# Patient Record
Sex: FEMALE | Race: OTHER | Employment: UNEMPLOYED | URBAN - METROPOLITAN AREA
[De-identification: names, ages, dates, MRNs, and addresses within clinical notes are randomized per-mention and may not be internally consistent; named-entity substitution may affect disease eponyms.]

---

## 2020-10-19 ENCOUNTER — HOSPITAL ENCOUNTER (OUTPATIENT)
Dept: PHYSICAL THERAPY | Age: 25
Discharge: HOME | End: 2020-10-19
Payer: COMMERCIAL

## 2020-10-19 PROCEDURE — 97162 PT EVAL MOD COMPLEX 30 MIN: CPT | Performed by: GENERAL ACUTE CARE HOSPITAL

## 2020-10-19 PROCEDURE — 97014 ELECTRIC STIMULATION THERAPY: CPT | Performed by: GENERAL ACUTE CARE HOSPITAL

## 2020-10-19 PROCEDURE — 97110 THERAPEUTIC EXERCISES: CPT | Performed by: GENERAL ACUTE CARE HOSPITAL

## 2020-10-19 NOTE — PROGRESS NOTES
[address] [facility] AT [address], [address] [place] [address] Road  Phone: [phone]   [phone]  PLAN OF CARE / [address] [place] [address] PHYSICAL THERAPY SERVICES  Patient Name: Ahsan Draper : 1995   Medical   Diagnosis: Right knee pain [M25.561] Treatment Diagnosis: Right knee pain [M25.561]   Onset Date: 10/8/20     Referral Source: Candance Boston, MD Start of [facility]): 10/19/2020   Prior Hospitalization: See medical history Provider #: [id number]   Prior Level of Function: Independent with all ADL's   Comorbidities: Depression, asthma, recent weight loss   Medications: Verified on Patient Summary List   The Plan of Care and following information is based on the information from the initial evaluation.   ===========================================================================================  Assessment / key information:  Pt is a 26 y/o female who presents s/p R ACL reconstruction with patellar tendon graft on 10/8/20. Pt states injury occurred in 2020 after jumping off a half flight of stairs and fell onto R knee. Went to [facility] whom stated patient only had a knee sprain. When pain and function did not improve, pt went to see an orthopedist. Pt is currently 1 wk, 4 days post op and has FU on . Pt has been using a CPM daily for mobility, initially performing 8 hours/day but recently only 4-6 hours/day- states she has worked up to 100 degrees flexion. Pt is icing once daily and reports that pain has been manageable. Pt weaned self from crutches and wearing knee immobilizer locked at 0 degrees.      FOTO 49/100  Pain (C) 4/10 \"dull ache\" (B) 3/10  (W) 8/10   Aggravating: activity  Easing: tylenol, ice  Palpation: +ttp R knee grossly   Circumference @ mid patella: R 45.5 cm, L 40.5 cm  Sensation: reports numbness of lateral knee and upper shin     Knee AROM: 8-75  PROM: 3-90  Patella mobility: guarded    Strength: Hip flex 3- based on SLR  Quad Set - poor, trace contraction  SLR- requiring 90% assistance    Gait : ambulating without crutches, brace locked in ext, antalgic gait with reduced R sided WB'ing, poor heel strike   Stairs: coached on correct pattern for step to pattern ascending with L LE and descending with R LE     ===========================================================================================  Eval Complexity: History HIGH Complexity :3+ comorbidities / personal factors will impact the outcome/ POC ;  Examination  HIGH Complexity : 4+ Standardized tests and measures addressing body structure, function, activity limitation and / or participation in recreation ; Presentation MEDIUM Complexity : Evolving with changing characteristics ; Decision Making MEDIUM Complexity : FOTO score of 26-74; Overall Complexity MEDIUM  Problem List: pain affecting function, decrease ROM, decrease strength, edema affecting function, impaired gait/ balance, decrease ADL/ functional abilitiies, decrease activity tolerance, decrease flexibility/ joint mobility and decrease transfer abilities   Treatment Plan may include any combination of the following: Therapeutic exercise, Therapeutic activities, Neuromuscular re-education, Physical agent/modality, Gait/balance training, Manual therapy, Aquatic therapy, Patient education, Self Care training, Functional mobility training, Home safety training and Stair training  Patient / Family readiness to learn indicated by: asking questions, trying to perform skills and interest  Persons(s) to be included in education: patient (P)  Barriers to Learning/Limitations: None  Measures taken, if barriers to learning:    Patient Goal (s): Better mobility, able to work out without limitation   Patient self reported health status: good  Rehabilitation Potential: good   Short Term Goals:  To be accomplished in  1  weeks:  1) Pt will be independent and compliant with HEP for carryover of strength and mobility gains   Long Term Goals: To be accomplished in  4-6  weeks:  1) Pt will score >72/100 on FOTO to show significant improvement in functional mobility and QOL  2) Pt will demo SLR without quad lag to show improved stance phase stability and readiness for (I) ambulation   3) Pt will demo full knee extension to 0 degrees for improved gait mechanics  4) Pt will demo knee flexion AROM >120 for ease of dressing ADL's and stair navigation  5) Pt will demo knee/hip strength >4+/5 for return to PLOF with normalized gait and squat kinematics   Frequency / Duration:   Patient to be seen  2-3  times per week for 4-6  weeks:  Patient / Caregiver education and instruction: activity modification and exercises  Therapist Signature: Hoa Perez PT Date: 60/04/6690   Certification Period: na Time: 7:12 AM   ===========================================================================================  I certify that the above Physical Therapy Services are being furnished while the patient is under my care. I agree with the treatment plan and certify that this therapy is necessary. Physician Signature:        Date:       Time:     Please sign and return to [facility] or you may fax the signed copy to [phone]. Thank you.

## 2020-10-19 NOTE — PROGRESS NOTES
PT DAILY TREATMENT NOTE     Patient Name: Contreras Guardado  Date:10/19/2020  : 1995  [x]  Patient  Verified  Payor: Praneeth Christianson / Plan: Dipika Escudero RPN / Product Type: Commerical /    In time:131  Out time:220  Total Treatment Time (min): 49  Total Timed Codes (min): 20  1:1 Treatment Time (min):39  Visit #: 1 of     Treatment Area: Right knee pain [M25.561]    SUBJECTIVE  Pain Level (0-10 scale): 4/10  Any medication changes, allergies to medications, adverse drug reactions, diagnosis change, or new procedure performed?: [x] No    [] Yes (see summary sheet for update)  Subjective functional status/changes:   [] No changes reported  Pt is a 28 y/o female who presents s/p R ACL reconstruction with patellar tendon graft on 10/8/20. Pt states injury occurred in 2020 after jumping off a half flight of stairs and fell onto R knee. Went to [facility] whom stated patient only had a knee sprain. When pain and function did not improve, pt went to see an orthopedist. Pt is currently 1 wk, 4 days post op and has FU on . Pt has been using a CPM daily for mobility, initially performing 8 hours/day but recently only 4-6 hours/day- states she has worked up to 100 degrees flexion. Pt is icing once daily and reports that pain has been manageable.  Pt weaned self from crutches and wearing knee immobilizer locked at 0 degrees.      FOTO 49/100  Pain (C) 4/10 \"dull ache\" (B) 3/10  (W) 8/10   Aggravating: activity  Easing: tylenol, ice  Palpation: +ttp R knee grossly   Circumference @ mid patella: R 45.5 cm, L 40.5 cm  Sensation: reports numbness of lateral knee and upper shin      Knee AROM: 8-75  PROM: 3-90  Patella mobility: guarded     Strength:   Hip flex 3- based on SLR  Quad Set - poor, trace contraction  SLR- requiring 90% assistance     Gait : ambulating without crutches, brace locked in ext, antalgic gait with reduced R sided WB'ing, poor heel strike   Stairs: coached on correct pattern for step to pattern ascending with L LE and descending with R LE     OBJECTIVE  Modality rationale: decrease edema, decrease inflammation, decrease pain and increase muscle contraction/control to improve the patients ability to improve stance phase stability and ambulation. Min Type Additional Details   10 [x] Estim: []Att   []Unatt  []TENS instruct                 []IFC  []Premod [x]NMES                       []Other: 10:10 on/off, with Quad set, co-contract 4 channels []w/US   [x]w/ice   []w/heat  Position:supine  Location: R quad , ice over knee    []  Traction: [] Cervical       []Lumbar                       [] Prone          []Supine                       []Intermittent   []Continuous Lbs:  [] before manual  [] after manual    []  Ultrasound: []Continuous   [] Pulsed                           []1MHz   []3MHz Location:  W/cm2:    []  Iontophoresis with dexamethasone         Location: [] Take home patch   [] In clinic    []  Ice     []  heat  []  Ice massage Position:  Location:    []  Vasopneumatic Device Pressure: [] lo [] med [] hi   Temp: [] lo [] med [] hi   [x] Skin assessment post-treatment:  [x]intact []redness- no adverse reaction       []redness  adverse reaction:       10 min Therapeutic Exercise:  [] See flow sheet :   Rationale: increase ROM and increase strength to improve the patients ability to ambulate and navigate stairs           min Patient Education: [x] Review HEP    [] Progressed/Changed HEP based on:   Provided patient with initial HEP- given printout and reviewed all exercises in clinic. Emphasized importance of knee extension AROM as primary goal along with pain/edema control.       Other Objective/Functional Measures:   Justification for Eval Code Complexity:  Patient History : depression, asthma, recent weight loss  Examination see exam   Clinical Presentation: evolving  Clinical Decision Making : FOTO : 49 /100    Pain Level (0-10 scale) post treatment: 3/10 \"sore\"    ASSESSMENT/Changes in Function: See POC    Patient will continue to benefit from skilled PT services to modify and progress therapeutic interventions, address functional mobility deficits, address ROM deficits, address strength deficits, analyze and address soft tissue restrictions, analyze and cue movement patterns, analyze and modify body mechanics/ergonomics, assess and modify postural abnormalities, address imbalance/dizziness and instruct in home and community integration to attain remaining goals.      [x]  See Plan of Care  []  See progress note/recertification  []  See Discharge Summary         Progress towards goals / Updated goals:  See POC    PLAN  [x]  Upgrade activities as tolerated     []  Continue plan of care  []  Update interventions per flow sheet       []  Discharge due to:_  []  Other:_      Hever Larios, PT 10/19/2020  10:07 AM

## 2020-10-23 ENCOUNTER — HOSPITAL ENCOUNTER (OUTPATIENT)
Dept: PHYSICAL THERAPY | Age: 25
Discharge: HOME | End: 2020-10-23
Payer: COMMERCIAL

## 2020-10-23 PROCEDURE — 97140 MANUAL THERAPY 1/> REGIONS: CPT

## 2020-10-23 PROCEDURE — 97110 THERAPEUTIC EXERCISES: CPT

## 2020-10-23 PROCEDURE — 97014 ELECTRIC STIMULATION THERAPY: CPT

## 2020-10-23 NOTE — PROGRESS NOTES
PT DAILY TREATMENT NOTE     Patient Name: Klaudia Welch  Date:10/23/2020  : 1995  [x]  Patient  Verified  Payor: Melissa Da Silva / Plan: Karla BLACKWOOD / Product Type: Commerical /    In time:1:18  Out time:2:23  Total Treatment Time (min): 65  Total Timed Codes (min): 65  1:1 Treatment Time (min):    Visit #: 2 of     Treatment Area: Right knee pain [M25.561]    SUBJECTIVE  Pain Level (0-10 scale): 0  Any medication changes, allergies to medications, adverse drug reactions, diagnosis change, or new procedure performed?: [x] No    [] Yes (see summary sheet for update)  Subjective functional status/changes:   [] No changes reported  My knee feels a lot better than it did last week, but I am still have a hard time sleeping as well as at the end of the day after getting off my feet from doing a lot of activity.     OBJECTIVE  Modality rationale: decrease inflammation, decrease pain and increase muscle contraction/control to improve the patients ability to improve quadriceps muscle recruitment/contraction   Min Type Additional Details   10 [] Estim: []Att   []Unatt  []TENS instruct                 []IFC  []Premod []NMES                       [x]Other:Indonesian 10 on/20 off  []w/US   [x]w/ice   []w/heat  Position:supine  Location:R quadriceps     []  Traction: [] Cervical       []Lumbar                       [] Prone          []Supine                       []Intermittent   []Continuous Lbs:  [] before manual  [] after manual    []  Ultrasound: []Continuous   [] Pulsed                           []1MHz   []3MHz Location:  W/cm2:    []  Iontophoresis with dexamethasone         Location: [] Take home patch   [] In clinic    []  Ice     []  heat  []  Ice massage Position:  Location:    []  Vasopneumatic Device Pressure: [] lo [] med [] hi   Temp: [] lo [] med [] hi   [] Skin assessment post-treatment:  []intact []redness- no adverse reaction       []redness  adverse reaction:       45 min Therapeutic Exercise: [x] See flow sheet :   Rationale: increase ROM, increase strength and increase proprioception to improve the patients ability to improve functional abilities     10 min Manual Therapy:  Patella mobs, manual hamstring stretching and flexion stretching/PROM on EOB   Rationale: decrease pain, increase ROM and increase tissue extensibility to improve functional mobility            min Patient Education: [x] Review HEP    [] Progressed/Changed HEP based on:   [] positioning   [] body mechanics   [] transfers   [] heat/ice application        Other Objective/Functional Measures:     Pain Level (0-10 scale) post treatment: 0    ASSESSMENT/Changes in Function: Pt tolerated all new therex fairly well upon trial today with chief c/o anterior knee pain/tension with pushing end range flexion mobility with self and passive stretching. Pt also needed min assist with SLR's as well as difficulty with LE management with bed mobility. Pt needs the most focus on quad strengthening and knee flexion mobility. Will continue to progress/advance patient within current POC as tolerated with monitoring symptoms. Patient will continue to benefit from skilled PT services to modify and progress therapeutic interventions, address functional mobility deficits, address ROM deficits, address strength deficits, analyze and address soft tissue restrictions, analyze and cue movement patterns and instruct in home and community integration to attain remaining goals. []  See Plan of Care  []  See progress note/recertification  []  See Discharge Summary         Progress towards goals / Updated goals: · Short Term Goals: To be accomplished in  1  weeks:  1) Pt will be independent and compliant with HEP for carryover of strength and mobility gains 10/23/20: Met  · Long Term Goals:  To be accomplished in  4-6  weeks:  1) Pt will score >72/100 on FOTO to show significant improvement in functional mobility and QOL  2) Pt will demo SLR without quad lag to show improved stance phase stability and readiness for (I) ambulation   3) Pt will demo full knee extension to 0 degrees for improved gait mechanics  4) Pt will demo knee flexion AROM >120 for ease of dressing ADL's and stair navigation  5) Pt will demo knee/hip strength >4+/5 for return to PLOF with normalized gait and squat kinematics     PLAN  [x]  Upgrade activities as tolerated     []  Continue plan of care  []  Update interventions per flow sheet       []  Discharge due to:_  []  Other:_      Marcelle Simpson PTA 10/23/2020  1:21 PM      Future Appointments   Date Time Provider Leidy Rvias   10/26/2020  9:30 AM Deana Mcqueen, PT [facility]   10/28/2020  2:45 PM Azar Young, PT [facility]   10/30/2020  9:45 AM John MOISE, PT [facility]   11/2/2020 11:15 AM Antonino Huggins, PTA [facility]   11/4/2020 11:15 AM Nuzhat Lemons, PT [facility]   11/6/2020 10:30 AM John MOISE, PT [facility]   11/9/2020  1:15 PM Azar Young, PT [facility]   11/11/2020 12:00 PM Ellyn BROWNLEE, PT [facility]   11/16/2020  1:15 PM Azar Hannah, PT [facility]   11/18/2020  1:30 PM Antonino Huggins, PROSPER [facility]   11/23/2020  1:15 PM Azar Young, PT [facility]   11/25/2020  2:15 PM Antonino Huggins PTA [facility]   11/30/2020  1:45 PM Antonino Huggins PTA [facility]

## 2020-10-26 ENCOUNTER — HOSPITAL ENCOUNTER (OUTPATIENT)
Dept: PHYSICAL THERAPY | Age: 25
Discharge: HOME | End: 2020-10-26
Payer: COMMERCIAL

## 2020-10-26 PROCEDURE — 97110 THERAPEUTIC EXERCISES: CPT

## 2020-10-26 PROCEDURE — 97014 ELECTRIC STIMULATION THERAPY: CPT

## 2020-10-26 PROCEDURE — 97140 MANUAL THERAPY 1/> REGIONS: CPT

## 2020-10-26 NOTE — PROGRESS NOTES
PT DAILY TREATMENT NOTE     Patient Name: Kayode Hayden  Date:10/26/2020  : 1995  [x]  Patient  Verified  Payor: Debbie Joyner / Plan: Andra Breaux RPN / Product Type: Commerical /    In time:  9:31 AM  Out time: 10:36  Total Treatment Time (min): 65  Total Timed Codes (min): 65  1:1 Treatment Time (min):    Visit #: 3 of     Treatment Area: Right knee pain [M25.561]    SUBJECTIVE  Pain Level (0-10 scale): 0/10  Any medication changes, allergies to medications, adverse drug reactions, diagnosis change, or new procedure performed?: [x] No    [] Yes (see summary sheet for update)  Subjective functional status/changes:   [] No changes reported  Pt c/o tightness to knee. States it is easier to sleep now. She feels her leg is doing better now than she was expecting. Has some difficulty with heel slide HEP since she d/c'd use of CPM last Thurs.        OBJECTIVE  Modality rationale: decrease inflammation, decrease pain and increase muscle contraction/control to improve the patients ability to improve quadriceps muscle recruitment/contraction   Min Type Additional Details   10 [x] Estim: []Att   []Unatt  []TENS instruct                 []IFC  []Premod []NMES                       [x]Other:Botswanan 10 on/30 off  []w/US   [x]w/ice   []w/heat  Position:supine  Location:R quadriceps     []  Traction: [] Cervical       []Lumbar                       [] Prone          []Supine                       []Intermittent   []Continuous Lbs:  [] before manual  [] after manual    []  Ultrasound: []Continuous   [] Pulsed                           []1MHz   []3MHz Location:  W/cm2:    []  Iontophoresis with dexamethasone         Location: [] Take home patch   [] In clinic    []  Ice     []  heat  []  Ice massage Position:  Location:    []  Vasopneumatic Device Pressure: [] lo [] med [] hi   Temp: [] lo [] med [] hi   [] Skin assessment post-treatment:  []intact []redness- no adverse reaction       []redness  adverse reaction: 45 min Therapeutic Exercise:  [x] See flow sheet :   Rationale: increase ROM, increase strength and increase proprioception to improve the patients ability to improve functional abilities     10 min Manual Therapy:  Patella mobs, manual hamstring stretching and flexion stretching/PROM    Rationale: decrease pain, increase ROM and increase tissue extensibility to improve functional mobility            min Patient Education: [x] Review HEP    [] Progressed/Changed HEP based on:   [] positioning   [] body mechanics   [] transfers   [] heat/ice application        Other Objective/Functional Measures:   -added AA LAQ at EOB, s/l hip abd in brace, prone hip ext in brace  -reviewed safe technique for self stretching with HEP (heel slides supine/seated)    Pain Level (0-10 scale) post treatment:  2/10    ASSESSMENT/Changes in Function: Mild edema to superior patella; bruising to proximal calf/lower leg. Pt with limited patellar glides sup/inf; addressed with manual.  Able to advance with exercises as per flow sheet; pt ed on DOMS. Continues to demo weak QS with exercises; not yet ready to progress out of brace in wb'ing. Patient will continue to benefit from skilled PT services to modify and progress therapeutic interventions, address functional mobility deficits, address ROM deficits, address strength deficits, analyze and address soft tissue restrictions, analyze and cue movement patterns and instruct in home and community integration to attain remaining goals. []  See Plan of Care  []  See progress note/recertification  []  See Discharge Summary         Progress towards goals / Updated goals: · Short Term Goals: To be accomplished in  1  weeks:  1) Pt will be independent and compliant with HEP for carryover of strength and mobility gains 10/23/20: Met    · Long Term Goals:  To be accomplished in  4-6  weeks:  1) Pt will score >72/100 on FOTO to show significant improvement in functional mobility and QOL  2) Pt will demo SLR without quad lag to show improved stance phase stability and readiness for (I) ambulation.-10/26: goal not met; QS fair with need for min A with SLR flexion  3) Pt will demo full knee extension to 0 degrees for improved gait mechanics  4) Pt will demo knee flexion AROM >120 for ease of dressing ADL's and stair navigation  5) Pt will demo knee/hip strength >4+/5 for return to [facility] with normalized gait and squat kinematics     PLAN  [x]  Upgrade activities as tolerated     []  Continue plan of care  []  Update interventions per flow sheet       []  Discharge due to:_  []  Other:_      Júnior Ordoñez, PT 10/26/2020  11:05 AM       Future Appointments   Date Time Provider Leidy Rivas   10/26/2020  9:30 AM Deana Mcqueen, PT [facility]   10/28/2020  2:45 PM Azar Young, PT [facility]   10/30/2020  9:45 AM John MOISE, PT [facility]   11/2/2020 11:15 AM Antonino Huggins, PROSPER [facility]   11/4/2020 11:15 AM Nuzhat Lemons, PT [facility]   11/6/2020 10:30 AM John MOISE, PT [facility]   11/9/2020  1:15 PM Azar Carp, PT [facility]   11/11/2020 12:00 PM Ellyn BROWNLEE, PT [facility]   11/16/2020  1:15 PM Azar Carp, PT [facility]   11/18/2020  1:30 PM Antonino Huggins, PROSPER [facility]   11/23/2020  1:15 PM Azar Carp, PT [facility]   11/25/2020  2:15 PM Antonino Huggins PTA [facility]   11/30/2020  1:45 PM Antonino Huggins PTA [facility]

## 2020-10-28 ENCOUNTER — HOSPITAL ENCOUNTER (OUTPATIENT)
Dept: PHYSICAL THERAPY | Age: 25
Discharge: HOME | End: 2020-10-28
Payer: COMMERCIAL

## 2020-10-28 PROCEDURE — 97110 THERAPEUTIC EXERCISES: CPT

## 2020-10-28 PROCEDURE — 97112 NEUROMUSCULAR REEDUCATION: CPT

## 2020-10-28 PROCEDURE — 97140 MANUAL THERAPY 1/> REGIONS: CPT

## 2020-10-28 PROCEDURE — 97014 ELECTRIC STIMULATION THERAPY: CPT

## 2020-10-28 NOTE — PROGRESS NOTES
PHYSICAL THERAPY - DAILY TREATMENT NOTE    Patient Name: David Springer        Date: 10/28/2020  : 1995   yes Patient  Verified  Visit #:   4     Insurance: Payor: Soraya Naik / Plan: [id number] RPN / Product Type: Commerical /      In time: 2:42 Out time: 3:42   Total Treatment Time: 60     Medicare/[facility] Time Tracking (below)   Total Timed Codes (min):  na 1:1 Treatment Time:  na     TREATMENT AREA =  Right knee pain [M25.561]    SUBJECTIVE  Pain Level (on 0 to 10 scale):  2  / 10   Medication Changes/New allergies or changes in medical history, any new surgeries or procedures?    no  If yes, update Summary List   Subjective Functional Status/Changes:  []  No changes reported     \"I think I overdid it trying to bend my knee yesterday.  I am pretty sore and it was swollen a little\"          OBJECTIVE  Modalities Rationale:     decrease inflammation, decrease pain and increase muscle contraction/control to improve patient's ability to complete ADLs  10 min [x] Estim, type/location: [place] to R VMO/RF, 65 pps, 5 sec ramp 10:30 w/ quad set in supine                                     []  att     [x]  unatt     []  w/US     [x]  w/ice    []  w/heat    min []  Mechanical Traction: type/lbs                   []  pro   []  sup   []  int   []  cont    []  before manual    []  after manual    min []  Ultrasound, settings/location:      min []  Iontophoresis w/ dexamethasone, location:                                               []  take home patch       []  in clinic    min []  Ice     []  Heat    location/position:     min []  Vasopneumatic Device, press/temp:     min []  Other:    [x] Skin assessment post-treatment (if applicable):    [x]  intact    [x]  redness- no adverse reaction     []redness  adverse reaction:        32 min Therapeutic Exercise:  [x]  See flow sheet   Rationale:      increase ROM, increase strength and improve coordination to improve the patients ability to ambulate     10 min Manual Therapy: Sup/inf patella mob, popliteal release, STM to bicep femoris, PROM knee flexion   Rationale:      decrease pain, increase ROM, increase tissue extensibility and decrease trigger points to improve patient's ability to flex R knee    8 min Neuromuscular Re-ed: [x]  See flow sheet   Rationale:    improve coordination, improve balance and increase proprioception to improve the patients ability to ambulate      Billed With/As:   [] TE   [] TA   [] Neuro   [] Self Care Patient Education: [x] Review HEP    [] Progressed/Changed HEP based on:   [] positioning   [] body mechanics   [] transfers   [] heat/ice application    [] other:      Other Objective/Functional Measures:    Heavy hip flexor compensation when performing SL hip abd; tactile cueing and assist to perform correctly  Able to perform 10x SLR flex w/o assist w/ brace donned and locked in extension  Added bike rocking for knee flexion AAROM, standing TKE and SLS to improve LE proprioception, and RDLs to improve posterior chain strength  Held OKC knee extension to prevent early strain to graft site, okay to resume around 4 weeks post op per recent studies  Pre manual AROM 1-90; post manual AROM 0-96     Post Treatment Pain Level (on 0 to 10) scale:   2  / 10     ASSESSMENT  Assessment/Changes in Function:     Pt demo improving quad/hip flexor strength as evident by ability to perform SLR flex w/o A today. Added multiple closed chain exercises to improve LE strength/stability and proprioception. Plan to incorporate additional proprioceptive exercises as tolerated at NV. Showed pt additional knee flexion AAROM exercises, including supine wall slides, as she continues to c/o pain and difficulty with heel slides.       []  See Progress Note/Recertification   Patient will continue to benefit from skilled PT services to modify and progress therapeutic interventions, address functional mobility deficits, address ROM deficits, address strength deficits, analyze and address soft tissue restrictions, analyze and cue movement patterns, analyze and modify body mechanics/ergonomics, assess and modify postural abnormalities and instruct in home and community integration to attain remaining goals. Progress toward goals / Updated goals: · Short Term Goals: To be accomplished in  1  weeks:  1) Pt will be independent and compliant with HEP for carryover of strength and mobility gains 10/23/20: Met     · Long Term Goals: To be accomplished in  4-6  weeks:  1) Pt will score >72/100 on FOTO to show significant improvement in functional mobility and QOL  2) Pt will demo SLR without quad lag to show improved stance phase stability and readiness for (I) ambulation.  10/28/2020: goal in progress, SLR in brace locked in ext w/o A  3) Pt will demo full knee extension to 0 degrees for improved gait mechanics 10/28/20: goal nearly met, knee ext AROM -1 deg pre manual, able to achieve TKE post manual  4) Pt will demo knee flexion AROM >120 for ease of dressing ADL's and stair navigation  5) Pt will demo knee/hip strength >4+/5 for return to PLOF with normalized gait and squat kinematics        PLAN  []  Upgrade activities as tolerated yes Continue plan of care   []  Discharge due to :    []  Other:      Therapist: Chantal Barba PT    Date: 10/28/2020 Time: 2:53 PM     Future Appointments   Date Time Provider Leidy Rivas   10/30/2020  9:45 AM Cr White, PT [facility]   11/2/2020 11:15 AM Salty Lay, PTA [facility]   11/4/2020 11:15 AM Declan Lemons, PT [facility]   11/6/2020 10:30 AM Gilbert MOISE, PT [facility]   11/9/2020  1:15 PM Ruthann Baker, PT [facility]   11/11/2020 12:00 PM Rob BROWNLEE, PT [facility]   11/16/2020  1:15 PM Ruthann Baker, PT [facility]   11/18/2020  1:30 PM Salty Lay, PTA [facility]   11/23/2020  1:15 PM Ruthann Baker, PT [facility]   11/25/2020  2:15 PM Salty Lay, PROSPER [facility] 11/30/2020  1:45 PM Elgin Ayon, PTA [facility] [address]

## 2020-10-30 ENCOUNTER — APPOINTMENT (OUTPATIENT)
Dept: PHYSICAL THERAPY | Age: 25
End: 2020-10-30
Payer: COMMERCIAL

## 2020-11-02 ENCOUNTER — HOSPITAL ENCOUNTER (OUTPATIENT)
Dept: PHYSICAL THERAPY | Age: 25
Discharge: HOME | End: 2020-11-02
Payer: COMMERCIAL

## 2020-11-02 PROCEDURE — 97140 MANUAL THERAPY 1/> REGIONS: CPT

## 2020-11-02 PROCEDURE — 97110 THERAPEUTIC EXERCISES: CPT

## 2020-11-02 NOTE — PROGRESS NOTES
[facility] PHYSICAL THERAPY  [address], [place], [address] - Phone: [phone]  Fax: [phone]  PROGRESS NOTE  Patient Name: Laurie Viera : 1995   Treatment/Medical Diagnosis: Right knee pain [M25.561]   Referral Source: Em Marte MD     Date of Initial Visit: 10/19/20 Attended Visits: 5 Missed Visits: 1     SUMMARY OF TREATMENT  Therapeutic exercise for R LE/knee focused mobility and strengthening within current guidelines of ACL protocol, manual intervention, electrical stimulation for quadriceps muscle re education, cryotherapy, patient education and HEP. CURRENT STATUS  Patient is currently approximately 3 1/2 weeks status post ACL re construction (10/8/20). Pt reports approximately 10-15% overall improvement from therapy since initial evaluation with 2/10 pain level on average, increased to 5-6/10 at the worst with prolonged standing ADL's as well as after performing therex regiment. Pt is making slow but steady progress with gaining R knee mobility and strengthening within appropriate guidelines of ACL protocol. Pt demonstrates approximately 15 degrees quadriceps lag with performing SLR's with ACL brace in locked position, but is able to perform them independently without assistance with moderate challenge. Pt would benefit from continued therapy for 24 additional visits to achieve maximum medical benefit/potential from current POC. Will continue to progress/advance patient within current POC as tolerated with monitoring symptoms.   Knee AROM measurements= (measured in supine) R=0-97 degrees; L=0-134 degrees; R knee flexion PROM= 103 degrees (measured on EOB)  LE strength measurements/MMT= (R/L) Quads R=2+/5 due to approximately 15 degree active quadriceps lag with performing SLR's with brace on without assistance with moderate challenge; L=5/5; Hamstrings= 4-/5, 5/5  HIP= (R/L) Flexion=4/5, 5/5; Abduction=4+/5, 5/5; Extension=4+/5 bilaterally   Goal/Measure of Progress Goal Met? 1. Pt will be independent and compliant with HEP for carryover of strength and mobility gains   Status at last Eval: Progressing  Current Status: Met yes   2. Pt will score >72/100 on FOTO to show significant improvement in functional mobility and QOL   Status at last Eval: 49/100 Current Status: 52/100 no   3. Pt will demo SLR without quad lag to show improved stance phase stability and readiness for (I) ambulation    Status at last Eval: SLR- requiring 90% assistance Current Status:  Pt demonstrates approximately 15 degrees quadriceps lag with performing SLR's with ACL brace in locked position, but is able to perform them independently without assistance with moderate challenge. no       Goal/Measure of Progress Goal Met? 4. Pt will demo full knee extension to 0 degrees for improved gait mechanics   Status at last Eval: R knee extension AROM= -8 degrees measured in supine Current Status: R knee extension AROM= 0 degrees measured in supine yes   5. Pt will demo knee flexion AROM >120 for ease of dressing ADL's and stair navigation   Status at last Eval: R knee flexion AROM= 75 degrees (measured in supine) Current Status: R knee flexion AROM= 97 degrees (measured in supine) no   6. Pt will demo knee/hip strength >4+/5 for return to PLOF with normalized gait and squat kinematics    Status at last Eval: Hip flex 3- based on SLR  Quad Set - poor, trace contraction  SLR- requiring 90% assistance Current Status: Quads R=2+/5 due to approximately 15 degree active quadriceps lag with performing SLR's with brace on without assistance with moderate challenge; Hamstrings= 4-/5  HIP= (R/L) Flexion=4/5, 5/5; Abduction=4+/5, 5/5; Extension=4+/5 bilaterally         no     New Goals to be achieved in __24__  treatments:  1. Pt will score >72/100 on FOTO to show significant improvement in functional mobility and QOL  2.   Pt will demo SLR without quad lag to show improved stance phase stability and readiness for (I) ambulation  3. Pt will demo knee flexion AROM >120 for ease of dressing ADL's and stair navigation  4. Pt will demo knee/hip strength >4+/5 for return to PLOF with normalized gait and squat kinematics   RECOMMENDATIONS  Continue with current POC for 24 additional visits with advancing as tolerated, then reassess for the need for continuation or discharge from therapy. If you have any questions/comments please contact us directly at [phone]. Thank you for allowing us to assist in the care of your patient. Therapist Signature: Carrie Griffith PTA Date: 11/2/2020    Deena Rehman PT, DPT, CMTPT Time: 12:35 PM   NOTE TO PHYSICIAN:  PLEASE COMPLETE THE ORDERS BELOW AND FAX TO   [facility]: [phone]  If you are unable to process this request in 24 hours please contact our office: [phone]    ___ I have read the above report and request that my patient continue as recommended.   ___ I have read the above report and request that my patient continue therapy with the following changes/special instructions:_________________________________________________________   ___ I have read the above report and request that my patient be discharged from therapy.      Physician Signature:        Date:       Time:

## 2020-11-02 NOTE — PROGRESS NOTES
PT DAILY TREATMENT NOTE     Patient Name: Sandra Willoughby  Date:2020  : 1995  [x]  Patient  Verified  Payor: Heidy Reardon / Plan: [employer]i [employer] RPN / Product Type: Commerical /    In time:11:15  Out time:12:15  Total Treatment Time (min): 60  Total Timed Codes (min): 50  1:1 Treatment Time (min):    Visit #: 5 of     Treatment Area: Right knee pain [M25.561]    SUBJECTIVE  Pain Level (0-10 scale): 1-2/10  Any medication changes, allergies to medications, adverse drug reactions, diagnosis change, or new procedure performed?: [x] No    [] Yes (see summary sheet for update)  Subjective functional status/changes:   [] No changes reported  My knee felt pretty good after I was here last time, I wasn't even that sore later on that night after doing all of the new exercises like I thought it would be.     OBJECTIVE  Modality rationale: decrease edema, decrease inflammation and decrease pain to improve the patients ability to improve functional abilities   Min Type Additional Details    [] Estim: []Att   []Unatt  []TENS instruct                 []IFC  []Premod []NMES                       []Other:  []w/US   []w/ice   []w/heat  Position:  Location:    []  Traction: [] Cervical       []Lumbar                       [] Prone          []Supine                       []Intermittent   []Continuous Lbs:  [] before manual  [] after manual    []  Ultrasound: []Continuous   [] Pulsed                           []1MHz   []3MHz Location:  W/cm2:    []  Iontophoresis with dexamethasone         Location: [] Take home patch   [] In clinic   10 [x]  Ice     []  heat  []  Ice massage Position:long sitting   Location:R knee    []  Vasopneumatic Device Pressure: [] lo [] med [] hi   Temp: [] lo [] med [] hi   [] Skin assessment post-treatment:  []intact []redness- no adverse reaction       []redness  adverse reaction:       35 min Therapeutic Exercise:  [x] See flow sheet :   Rationale: increase ROM, increase strength, improve balance and increase proprioception to improve the patients ability to improve functional abilities     15 min Manual Therapy:  Removed steri strips, patella mobs, knee flexion PROM on EOB and extensive bilateral LE strength/MMT measurements    Rationale: decrease pain, increase ROM, increase tissue extensibility and decrease trigger points to improve functional mobility            min Patient Education: [x] Review HEP    [] Progressed/Changed HEP based on:   [] positioning   [] body mechanics   [] transfers   [] heat/ice application        Other Objective/Functional Measures:     Pain Level (0-10 scale) post treatment: 1/10    ASSESSMENT/Changes in Function:       Patient will continue to benefit from skilled PT services to modify and progress therapeutic interventions, address functional mobility deficits, address ROM deficits, address strength deficits, analyze and address soft tissue restrictions, analyze and cue movement patterns and instruct in home and community integration to attain remaining goals. []  See Plan of Care  [x]  See progress note/recertification  []  See Discharge Summary         Progress towards goals / Updated goals:  See Progress note/MNR for full detailed progress towards established goals.     PLAN  []  Upgrade activities as tolerated     []  Continue plan of care  []  Update interventions per flow sheet       []  Discharge due to:_  []  Other:_      Raj Cho PTA 11/2/2020  11:21 AM      Future Appointments   Date Time Provider Leidy Daughertyi   11/4/2020 11:15 AM Eyal Lemons, PT [facility]   11/6/2020 10:30 AM Delayne Schilder D., PT [facility]   11/9/2020  1:15 PM Tete Chen, PT [facility]   11/11/2020 12:00 PM Tete Chen, PT [facility]   11/16/2020  1:15 PM Tenisha Torres, PT [facility]   11/18/2020  1:30 PM Aurora Ibrahim, PROSPER [facility]   11/23/2020  1:15 PM Tenisha Torres, PT [facility]   11/25/2020  2:15 PM Aurora Ibrahim PTA [facility] 11/30/2020  1:45 PM Genesis Burden, PTA [facility]

## 2020-11-04 ENCOUNTER — HOSPITAL ENCOUNTER (OUTPATIENT)
Dept: PHYSICAL THERAPY | Age: 25
Discharge: HOME | End: 2020-11-04
Payer: COMMERCIAL

## 2020-11-04 PROCEDURE — 97110 THERAPEUTIC EXERCISES: CPT

## 2020-11-04 PROCEDURE — 97014 ELECTRIC STIMULATION THERAPY: CPT

## 2020-11-04 PROCEDURE — 97140 MANUAL THERAPY 1/> REGIONS: CPT

## 2020-11-04 NOTE — PROGRESS NOTES
PHYSICAL THERAPY - DAILY TREATMENT NOTE    Patient Name: Kayley Saez        Date: 2020  : 1995   yes Patient  Verified  Visit #:   6     Insurance: Payor: Cam Mckinnon / Plan: Renato Mancia RPN / Product Type: Commerical /      In time: 11:16 Out time: 11:08   Total Treatment Time: 52     Medicare/[employer] Time Tracking (below)   Total Timed Codes (min):  na 1:1 Treatment Time:  na     TREATMENT AREA =  Right knee pain [M25.561]    SUBJECTIVE  Pain Level (on 0 to 10 scale):  0  / 10   Medication Changes/New allergies or changes in medical history, any new surgeries or procedures?    no  If yes, update Summary List   Subjective Functional Status/Changes:  []  No changes reported     Pt reports no pain this AM. Reports she continues to perform her exercises at home.            OBJECTIVE  Modalities Rationale:     decrease edema, decrease inflammation, decrease pain and increase muscle contraction/control to improve patient's ability to ambulate w/o brace  10 min [x] Estim, type/location: [place] stim to R quad, 65 pps, 5 sec ramp 10:50 w/ quad set                                     []  att     []  unatt     []  w/US     []  w/ice    []  w/heat    min []  Mechanical Traction: type/lbs                   []  pro   []  sup   []  int   []  cont    []  before manual    []  after manual    min []  Ultrasound, settings/location:      min []  Iontophoresis w/ dexamethasone, location:                                               []  take home patch       []  in clinic    min []  Ice     []  Heat    location/position:     min []  Vasopneumatic Device, press/temp:     min []  Other:    [] Skin assessment post-treatment (if applicable):    []  intact    []  redness- no adverse reaction     []redness  adverse reaction:        32 min Therapeutic Exercise:  [x]  See flow sheet   Rationale:      increase ROM, increase strength, improve coordination, improve balance and increase proprioception to improve the patients ability to ambulate, transfer     10 min Manual Therapy: Patella mobs, STM to mid-substance RF, calf and hs str, PROM knee flexion   Rationale:      decrease pain, increase ROM, increase tissue extensibility and decrease trigger points to improve patient's ability to ambulate, transfer  The manual therapy interventions were performed at a separate and distinct time from the therapeutic activities interventions. Billed With/As:   [x] TE   [] TA   [] Neuro   [] Self Care Patient Education: [x] Review HEP    [] Progressed/Changed HEP based on:   [] positioning   [] body mechanics   [] transfers   [] heat/ice application    [] other:      Other Objective/Functional Measures:    Able to perform SLR flex w/ brace unlocked, minimal extensor lag noted but limited to sets of 5 2' fatigue  Knee flexion PROM cont to be limited to ~100 deg     Post Treatment Pain Level (on 0 to 10) scale:   0  / 10     ASSESSMENT  Assessment/Changes in Function:     Cleared pt to perform SLR w/ brace unlocked at home, continue to ambulate w/ brace locked. If pt demo ability to perform 10x SLR flex w/o lag and demo ability to ambulate in clinic w/ brace unlocked, will clear pt to begin ambulation in home w/ brace unlocked      []  See Progress Note/Recertification   Patient will continue to benefit from skilled PT services to modify and progress therapeutic interventions, address functional mobility deficits, address ROM deficits, address strength deficits, analyze and address soft tissue restrictions, analyze and cue movement patterns, analyze and modify body mechanics/ergonomics, assess and modify postural abnormalities, address imbalance/dizziness and instruct in home and community integration to attain remaining goals. Progress toward goals / Updated goals:    1.  Pt will score >72/100 on FOTO to show significant improvement in functional mobility and QOL  2.  Pt will demo SLR without quad lag to show improved stance phase stability and readiness for (I) ambulation 11/4/20: mild extensor lag, SLR 2x5  3. Pt will demo knee flexion AROM >120 for ease of dressing ADL's and stair navigation  4. Pt will demo knee/hip strength >4+/5 for return to PLOF with normalized gait and squat kinematics        PLAN  []  Upgrade activities as tolerated yes Continue plan of care   []  Discharge due to :    []  Other:      Therapist: Adele Bullock PT    Date: 11/4/2020 Time: 11:13 AM     Future Appointments   Date Time Provider Leidy Rivas   11/4/2020 11:15 AM Kaye Lemons, PT [facility]   11/6/2020 10:30 AM Maida MOISE, PT [facility]   11/9/2020  1:15 PM Mercedes Desir, PT [facility]   11/11/2020 12:00 PM Mercedes Desir, PT [facility]   11/16/2020  1:15 PM Mercedes Desir, PT [facility]   11/18/2020  1:30 PM Joie Michaels, PTA [facility]   11/23/2020  1:15 PM Mercedes Desir, PT [facility]   11/25/2020  2:15 PM Joie Michaels, PTA [facility]   11/30/2020  1:45 PM Joie Michaels, PTA [facility]

## 2020-11-06 ENCOUNTER — HOSPITAL ENCOUNTER (OUTPATIENT)
Dept: PHYSICAL THERAPY | Age: 25
Discharge: HOME | End: 2020-11-06
Payer: COMMERCIAL

## 2020-11-06 PROCEDURE — 97110 THERAPEUTIC EXERCISES: CPT

## 2020-11-06 PROCEDURE — 97140 MANUAL THERAPY 1/> REGIONS: CPT

## 2020-11-06 PROCEDURE — 97014 ELECTRIC STIMULATION THERAPY: CPT

## 2020-11-06 NOTE — PROGRESS NOTES
PHYSICAL THERAPY - DAILY TREATMENT NOTE    Patient Name: Kerri Aleman        Date: 2020  : 1995   yes Patient  Verified  Visit #:     Insurance: Payor: Christopher Tran / Plan: Manuel Payne RPN / Product Type: Commerical /      In time: 10:31 AM Out time: 11:30   Total Treatment Time: 59     Medicare/[facility] Time Tracking (below)   Total Timed Codes (min):  na 1:1 Treatment Time:  na     TREATMENT AREA =  Right knee pain [M25.561]    SUBJECTIVE  Pain Level (on 0 to 10 scale):  0  / 10   Medication Changes/New allergies or changes in medical history, any new surgeries or procedures?    no  If yes, update Summary List   Subjective Functional Status/Changes:  []  No changes reported     Pt states her knee has been feeling a little stiff/swollen especially in the AM.       OBJECTIVE  Modalities Rationale:     decrease edema, decrease inflammation, decrease pain and increase muscle contraction/control to improve patient's ability to ambulate w/o brace  10 min [x] Estim, type/location: [place] stim to R quad, 65 pps, 5 sec ramp 10:30 w/ quad set                                     []  att     [x]  unatt     []  w/US     [x]  w/ice    []  w/heat    min []  Mechanical Traction: type/lbs                   []  pro   []  sup   []  int   []  cont    []  before manual    []  after manual    min []  Ultrasound, settings/location:      min []  Iontophoresis w/ dexamethasone, location:                                               []  take home patch       []  in clinic    min []  Ice     []  Heat    location/position:     min []  Vasopneumatic Device, press/temp:     min []  Other:    [] Skin assessment post-treatment (if applicable):    []  intact    []  redness- no adverse reaction     []redness  adverse reaction:        37 min Therapeutic Exercise:  [x]  See flow sheet   Rationale:      increase ROM, increase strength, improve coordination, improve balance and increase proprioception to improve the patients ability to ambulate, transfer       12 min Manual Therapy: Patella mobs, STM to mid-substance RF, calf and hs str, PROM knee flexion/ext   Rationale:      decrease pain, increase ROM, increase tissue extensibility and decrease trigger points to improve patient's ability to ambulate, transfer  The manual therapy interventions were performed at a separate and distinct time from the therapeutic activities interventions. Billed With/As:   [x] TE   [] TA   [] Neuro   [] Self Care Patient Education: [x] Review HEP    [] Progressed/Changed HEP based on:   [] positioning   [] body mechanics   [] transfers   [] heat/ice application    [] other:      Other Objective/Functional Measures:    -knee rocks to ~ 90 in brace on bike today  -c/o painless intermittent popping with strap heel slides  -added hurdles in // bars; ambulation in clinic with brace unlocked with supervision and cueing to maintain stability/QS in stance phase     Post Treatment Pain Level (on 0 to 10) scale:   2 / 10     ASSESSMENT  Assessment/Changes in Function:     Pt able to complete 10 reps SLR flexion with minimal extensor lag towards rep 7. Pt advised to continue ambulation in home with brace locked pending ability to demonstrate sufficient quad strength in clinic. []  See Progress Note/Recertification   Patient will continue to benefit from skilled PT services to modify and progress therapeutic interventions, address functional mobility deficits, address ROM deficits, address strength deficits, analyze and address soft tissue restrictions, analyze and cue movement patterns, analyze and modify body mechanics/ergonomics, assess and modify postural abnormalities, address imbalance/dizziness and instruct in home and community integration to attain remaining goals. Progress toward goals / Updated goals:    1.  Pt will score >72/100 on FOTO to show significant improvement in functional mobility and QOL  2.  Pt will demo SLR without quad lag to show improved stance phase stability and readiness for (I) ambulation 11/4/20: mild extensor lag, SLR 2x5  3. Pt will demo knee flexion AROM >120 for ease of dressing ADL's and stair navigation  4. Pt will demo knee/hip strength >4+/5 for return to PLOF with normalized gait and squat kinematics        PLAN  []  Upgrade activities as tolerated yes Continue plan of care   []  Discharge due to :    []  Other:      Therapist: Bernardo Pizarro.  Mikayla Moreira, PT    Date: 11/6/2020 Time: 12:21 PM      Future Appointments   Date Time Provider Leidy Daughertyi   11/6/2020 10:30 AM Greg Bloom, PT [facility]   11/9/2020  1:15 PM Arcenio BROWNLEE, PT [facility]   11/11/2020 12:00 PM Stevan Woodson, PT [facility]   11/16/2020  1:15 PM Stevan Woodson, PT [facility]   11/18/2020  1:30 PM Naheed Sine, PTA [facility]   11/23/2020  1:15 PM Stevan Woodson, PT [facility]   11/25/2020  2:15 PM Naheed Sine, PTA [facility]   11/30/2020  1:45 PM Naheed Sine, PTA [facility]

## 2020-11-09 ENCOUNTER — HOSPITAL ENCOUNTER (OUTPATIENT)
Dept: PHYSICAL THERAPY | Age: 25
Discharge: HOME | End: 2020-11-09
Payer: COMMERCIAL

## 2020-11-09 PROCEDURE — 97110 THERAPEUTIC EXERCISES: CPT

## 2020-11-09 PROCEDURE — 97014 ELECTRIC STIMULATION THERAPY: CPT

## 2020-11-09 PROCEDURE — 97140 MANUAL THERAPY 1/> REGIONS: CPT

## 2020-11-09 NOTE — PROGRESS NOTES
PHYSICAL THERAPY - DAILY TREATMENT NOTE    Patient Name: Kerri Aleman        Date: 2020  : 1995   yes Patient  Verified  Visit #:     Insurance: Payor: Christopher Tran / Plan: Manuel Payne RPN / Product Type: Commerical /      In time: 1:15 Out time: 2:19   Total Treatment Time: 64     Medicare/[employer] Time Tracking (below)   Total Timed Codes (min):  na 1:1 Treatment Time:  na     TREATMENT AREA =  Right knee pain [M25.561]    SUBJECTIVE  Pain Level (on 0 to 10 scale):  0  / 10   Medication Changes/New allergies or changes in medical history, any new surgeries or procedures?    no  If yes, update Summary List   Subjective Functional Status/Changes:  []  No changes reported     Pt reports no changes since her last visit. Reports she continues to ambulate w/ brace locked as per PT recommendations last visit.           OBJECTIVE  Modalities Rationale:     decrease inflammation, decrease pain and increase muscle contraction/control to improve patient's ability to ambulate w/o brace  12 min [x] Estim, type[facility] in long sit, 10:50, 65 pps 5 sec ramp w/ quad set                                     []  att     [x]  unatt     []  w/US     [x]  w/ice    []  w/heat    min []  Mechanical Traction: type/lbs                   []  pro   []  sup   []  int   []  cont    []  before manual    []  after manual    min []  Ultrasound, settings/location:      min []  Iontophoresis w/ dexamethasone, location:                                               []  take home patch       []  in clinic    min []  Ice     []  Heat    location/position:     min []  Vasopneumatic Device, press/temp:     min []  Other:    [x] Skin assessment post-treatment (if applicable):    [x]  intact    []  redness- no adverse reaction     []redness  adverse reaction:        42 min Therapeutic Exercise:  [x]  See flow sheet   Rationale:      increase ROM, increase strength, improve coordination, improve balance and increase proprioception to improve the patients ability to ambulate, transfer     10 min Manual Therapy: Effleurage massage to the R knee, patella mobs, gentle scar massage, PROM knee flexion   Rationale:      decrease pain, increase ROM, increase tissue extensibility and decrease trigger points to improve patient's ability to complete ADLs  The manual therapy interventions were performed at a separate and distinct time from the therapeutic activities interventions. Billed With/As:   [x] TE   [] TA   [] Neuro   [] Self Care Patient Education: [x] Review HEP    [] Progressed/Changed HEP based on:   [] positioning   [] body mechanics   [] transfers   [] heat/ice application    [] other:      Other Objective/Functional Measures:    Progressed to L8 TG squats w/ pt reporting inc quad fatigue  peformed all standing ex w/ brace unlocked, cueing for quad contraction throughout  Progressed SLS to Airex pad, intermittent hand tap to bars to maintain balance  Challenged to clear R foot when NR allan negotiation when leading L; slight L heel raise to achieve  Added slider HS curls and supine bridges to improve posterior chain strength  Added forward/lateral planks to improve core strength  Mid patella edema = 44.5 cm; 2\" above = 47 cm  AROM knee flexion 100 deg     Post Treatment Pain Level (on 0 to 10) scale:   0  / 10     ASSESSMENT  Assessment/Changes in Function:     Pt demo ability to perform 10x SLR w/o extensor lag and ambulated in clinic w/ brace unlocked. Cleared pt to amb in home w/ brace unlocked; cont locked for long distances. Advised pt may remove brace when sedentary in home. Cont to sleep w/ brace donned.       []  See Progress Note/Recertification   Patient will continue to benefit from skilled PT services to modify and progress therapeutic interventions, address functional mobility deficits, address ROM deficits, address strength deficits, analyze and address soft tissue restrictions, analyze and cue movement patterns, analyze and modify body mechanics/ergonomics, assess and modify postural abnormalities, address imbalance/dizziness and instruct in home and community integration to attain remaining goals. Progress toward goals / Updated goals:    1. Pt will score >72/100 on FOTO to show significant improvement in functional mobility and QOL  2.  Pt will demo SLR without quad lag to show improved stance phase stability and readiness for (I) ambulation 11/4/20: mild extensor lag, SLR 2x5; 11/9/20: 10x SLR w/o extensor lag; goal met  3. Pt will demo knee flexion AROM >120 for ease of dressing ADL's and stair navigation 11/9/20: goal in progress, knee flexion AROM 100 deg  4. Pt will demo knee/hip strength >4+/5 for return to PLOF with normalized gait and squat kinematics        PLAN  []  Upgrade activities as tolerated yes Continue plan of care   []  Discharge due to :    []  Other:      Therapist: Rodney Norwood PT    Date: 11/9/2020 Time: 1:06 PM     Future Appointments   Date Time Provider Leidy Rivas   11/9/2020  1:15 PM Nikia Real PT [facility]   11/11/2020 12:00 PM Nikia Real PT [facility]   11/16/2020  1:15 PM Nikia Real PT [facility]   11/18/2020  1:30 PM Genesis Burden PTA [facility]   11/23/2020  1:15 PM Nikia Real PT [facility]   11/25/2020  2:15 PM Genesis Burden PTA [facility]   11/30/2020  1:45 PM Genesis Burden PTA [facility]

## 2020-11-11 ENCOUNTER — APPOINTMENT (OUTPATIENT)
Dept: PHYSICAL THERAPY | Age: 25
End: 2020-11-11
Payer: COMMERCIAL

## 2020-11-16 ENCOUNTER — HOSPITAL ENCOUNTER (OUTPATIENT)
Dept: PHYSICAL THERAPY | Age: 25
Discharge: HOME | End: 2020-11-16
Payer: COMMERCIAL

## 2020-11-16 PROCEDURE — 97140 MANUAL THERAPY 1/> REGIONS: CPT

## 2020-11-16 PROCEDURE — 97110 THERAPEUTIC EXERCISES: CPT

## 2020-11-16 NOTE — PROGRESS NOTES
PHYSICAL THERAPY - DAILY TREATMENT NOTE    Patient Name: Klaudia Welch        Date: 2020  : 1995   yes Patient  Verified  Visit #:     Insurance: Payor: Melissa Da Silva / Plan: Karla BLACKWOOD / Product Type: Commerical /      In time: 1:17 Out time: 2:18   Total Treatment Time: 61     Medicare/[employer] Time Tracking (below)   Total Timed Codes (min):  na 1:1 Treatment Time:  na     TREATMENT AREA =  Right knee pain [M25.561]    SUBJECTIVE  Pain Level (on 0 to 10 scale):  0  / 10   Medication Changes/New allergies or changes in medical history, any new surgeries or procedures?    no  If yes, update Summary List   Subjective Functional Status/Changes:  []  No changes reported     \"I have been walking around my house with the brace unlocked and it feels good\"          OBJECTIVE  Modalities Rationale:     decrease inflammation and decrease pain to improve patient's ability to ambulate   min [] Estim, type/location:                                      []  att     []  unatt     []  w/US     []  w/ice    []  w/heat    min []  Mechanical Traction: type/lbs                   []  pro   []  sup   []  int   []  cont    []  before manual    []  after manual    min []  Ultrasound, settings/location:      min []  Iontophoresis w/ dexamethasone, location:                                               []  take home patch       []  in clinic   10 min [x]  Ice     []  Heat    location/position: To R knee in long sit    min []  Vasopneumatic Device, press/temp:     min []  Other:    [x] Skin assessment post-treatment (if applicable):    [x]  intact    []  redness- no adverse reaction     []redness  adverse reaction:        41 min Therapeutic Exercise:  [x]  See flow sheet   Rationale:      increase ROM, increase strength, improve coordination, improve balance and increase proprioception to improve the patients ability to ambulate     10 min Manual Therapy: Patella mobs, scar massage, PROM knee flexion   Rationale: decrease pain, increase ROM, increase tissue extensibility and decrease trigger points to improve patient's ability to transfer  The manual therapy interventions were performed at a separate and distinct time from the therapeutic activities interventions. Billed With/As:   [x] TE   [] TA   [] Neuro   [] Self Care Patient Education: [x] Review HEP    [] Progressed/Changed HEP based on:   [] positioning   [] body mechanics   [] transfers   [] heat/ice application    [] other:      Other Objective/Functional Measures:    Performed all therex w/ brace unlocked and SLR w/o brace  No extensor lag noted SLR flex  Quad set fair R  Added SL adduction to improve LE strength  Knee flexion AROM (post manual) = 116 deg     Post Treatment Pain Level (on 0 to 10) scale:   0  / 10     ASSESSMENT  Assessment/Changes in Function:     Cont ambulation w/ brace unlocked, as pt demo adequate quad strength.      _  See Progress Note/Recertification   Patient will continue to benefit from skilled PT services to modify and progress therapeutic interventions, address functional mobility deficits, address ROM deficits, address strength deficits, analyze and address soft tissue restrictions, analyze and cue movement patterns, analyze and modify body mechanics/ergonomics, assess and modify postural abnormalities, address imbalance/dizziness and instruct in home and community integration to attain remaining goals. Progress toward goals / Updated goals:    1. Pt will score >72/100 on FOTO to show significant improvement in functional mobility and QOL  2.  Pt will demo SLR without quad lag to show improved stance phase stability and readiness for (I) ambulation 11/4/20: mild extensor lag, SLR 2x5; 11/9/20: 10x SLR w/o extensor lag; goal met   3. Pt will demo knee flexion AROM >120 for ease of dressing ADL's and stair navigation 11/9/20: goal in progress, knee flexion AROM 100 deg; 11/16/20: knee flexion 116 AROM post manual tx  4. Pt will demo knee/hip strength >4+/5 for return to PLOF with normalized gait and squat kinematics        PLAN  []  Upgrade activities as tolerated yes Continue plan of care   []  Discharge due to :    []  Other:      Therapist: Rigo Ornelas PT    Date: 11/16/2020 Time: 1:33 PM     Future Appointments   Date Time Provider Leidy Rivas   11/18/2020  1:30 PM Colby Rudolph [facility]   11/23/2020  1:15 PM Hansel Farley PT [facility]   11/25/2020  2:15 PM Mimi Burns PTA [facility]   11/30/2020  1:45 PM Mimi Burns PTA [facility]

## 2020-11-18 ENCOUNTER — HOSPITAL ENCOUNTER (OUTPATIENT)
Dept: PHYSICAL THERAPY | Age: 25
Discharge: HOME | End: 2020-11-18
Payer: COMMERCIAL

## 2020-11-18 PROCEDURE — 97140 MANUAL THERAPY 1/> REGIONS: CPT

## 2020-11-18 PROCEDURE — 97110 THERAPEUTIC EXERCISES: CPT

## 2020-11-18 NOTE — PROGRESS NOTES
PHYSICAL THERAPY - DAILY TREATMENT NOTE    Patient Name: Kateryna Roberts        Date: 2020  : 1995   yes Patient  Verified  Visit #:   10   of   29  Insurance: Payor: Jeremie Molina / Plan: Ryan BLACKWOOD / Product Type: Commerical /      In time: 1:30 Out time: 2:44   Total Treatment Time: 74     Medicare/[facility] Time Tracking (below)   Total Timed Codes (min):   1:1 Treatment Time:       TREATMENT AREA =  Right knee pain [M25.561]    SUBJECTIVE  Pain Level (on 0 to 10 scale):  0  / 10   Medication Changes/New allergies or changes in medical history, any new surgeries or procedures? yes  If yes, update Summary List   Subjective Functional Status/Changes:  []  No changes reported   I really don't feel that much pain in my knee at all anymore unless I overdo it on my feet for too long.           OBJECTIVE  Modalities Rationale:     decrease inflammation and decrease pain to improve patient's ability to improve functional abilities   min [] Estim, type/location:                                      []  att     []  unatt     []  w/US     []  w/ice    []  w/heat    min []  Mechanical Traction: type/lbs                   []  pro   []  sup   []  int   []  cont    []  before manual    []  after manual    min []  Ultrasound, settings/location:      min []  Iontophoresis w/ dexamethasone, location:                                               []  take home patch       []  in clinic   10 min [x]  Ice     []  Heat    location/position: R knee/long sitting    min []  Vasopneumatic Device, press/temp:     min []  Other:    [] Skin assessment post-treatment (if applicable):    []  intact    []  redness- no adverse reaction     []redness  adverse reaction:        52 min Therapeutic Exercise:  [x]  See flow sheet   Rationale:      increase ROM, increase strength, improve balance and increase proprioception to improve the patients ability to improve functional abilities      12 min Manual Therapy: Patella mobs, STM/tissue mobs to distal quads,scar massage, PROM knee flexion   Rationale:      decrease pain, increase ROM, increase tissue extensibility and decrease trigger points to improve patient's ability to improve functional mobility   The manual therapy interventions were performed at a separate and distinct time from the therapeutic activities interventions. Billed With/As:   [] TE   [] TA   [] Neuro   [] Self Care Patient Education: [x] Review HEP    [] Progressed/Changed HEP based on:   [] positioning   [] body mechanics   [] transfers   [] heat/ice application    [] other:      Other Objective/Functional Measures:         Post Treatment Pain Level (on 0 to 10) scale:   0  / 10     ASSESSMENT  Assessment/Changes in Function:   Pt was challenged with strength and endurance with increasing reps with hamstring curls with slider as well as with balance/proprioceptive awareness with increasing to foam with standing 3 way hip PRE's standing on involved side without UE's today. Pt is progressing well with gaining knee flexion mobility and is expected to be able to discontinue use of brace after MD f/u before next treatment. Will continue to progress/advance patient within current POC as tolerated with monitoring symptoms. []  See Progress Note/Recertification   Patient will continue to benefit from skilled PT services to modify and progress therapeutic interventions, address functional mobility deficits, address ROM deficits, address strength deficits, analyze and address soft tissue restrictions, analyze and cue movement patterns and instruct in home and community integration to attain remaining goals.    Progress toward goals / Updated goals:  1. Pt will score >72/100 on FOTO to show significant improvement in functional mobility and QOL  2.  Pt will demo SLR without quad lag to show improved stance phase stability and readiness for (I) ambulation 11/4/20: mild extensor lag, SLR 2x5; 11/9/20: 10x SLR w/o extensor lag; goal met 11/18/20: Pt has a slight quad lag with performing SLR's without brace  3. Pt will demo knee flexion AROM >120 for ease of dressing ADL's and stair navigation 11/9/20: goal in progress, knee flexion AROM 100 deg; 11/16/20: knee flexion 116 AROM post manual tx  4. Pt will demo knee/hip strength >4+/5 for return to PLOF with normalized gait and squat kinematics        PLAN  [x]  Upgrade activities as tolerated yes Continue plan of care   []  Discharge due to :    []  Other:      Therapist: Robina Castleman, PTA    Date: 11/18/2020 Time: 1:38 PM     Future Appointments   Date Time Provider Leidy Rivas   11/23/2020  1:15 PM Isa Woods, PT [facility]   11/25/2020  2:15 PM Madi Soda, PTA [facility]   11/30/2020  1:45 PM Madi Soda, PTA [facility]   12/2/2020  1:30 PM Madi Soda, PTA [facility]   12/7/2020  1:15 PM Isa Woods, PT [facility]   12/9/2020  2:15 PM Madi Soda, PTA [facility]   12/14/2020  1:15 PM Isa Woods, PT [facility]   12/16/2020  1:30 PM Madi Soda, PTA [facility]   12/21/2020  1:00 PM Madi Soda, PTA [facility]   12/23/2020  1:30 PM Madi Soda, PTA [facility]   12/28/2020  1:00 PM Madi Soda, PTA [facility]   12/30/2020  1:30 PM Madi Soda, PTA [facility]

## 2020-11-23 ENCOUNTER — HOSPITAL ENCOUNTER (OUTPATIENT)
Dept: PHYSICAL THERAPY | Age: 25
Discharge: HOME | End: 2020-11-23
Payer: COMMERCIAL

## 2020-11-23 PROCEDURE — 97140 MANUAL THERAPY 1/> REGIONS: CPT

## 2020-11-23 PROCEDURE — 97110 THERAPEUTIC EXERCISES: CPT

## 2020-11-23 NOTE — PROGRESS NOTES
PHYSICAL THERAPY - DAILY TREATMENT NOTE    Patient Name: Oliva Ortiz        Date: 2020  : 1995   yes Patient  Verified  Visit #:     Insurance: Payor: Shae Rosales / Plan: Beatriz Cedillo RPN / Product Type: Commerical /      In time: 1:15 Out time: 2:26   Total Treatment Time: 71     Medicare/[facility] Time Tracking (below)   Total Timed Codes (min):   1:1 Treatment Time:       TREATMENT AREA =  Right knee pain [M25.561]    SUBJECTIVE  Pain Level (on 0 to 10 scale):  0  / 10   Medication Changes/New allergies or changes in medical history, any new surgeries or procedures? yes  If yes, update Summary List   Subjective Functional Status/Changes:  []  No changes reported   I don't have any pain in my knee, but my quads are sore from a combination of getting used to walking without the brace as well as moving this past weekend.           OBJECTIVE  Modalities Rationale:     decrease pain and increase tissue extensibility to improve patient's ability to improve functional abilities    min [] Estim, type/location:                                      []  att     []  unatt     []  w/US     []  w/ice    []  w/heat    min []  Mechanical Traction: type/lbs                   []  pro   []  sup   []  int   []  cont    []  before manual    []  after manual    min []  Ultrasound, settings/location:      min []  Iontophoresis w/ dexamethasone, location:                                               []  take home patch       []  in clinic   10 min [x]  Ice     []  Heat    location/position: R quads/knee in long sitting     min []  Vasopneumatic Device, press/temp:     min []  Other:    [] Skin assessment post-treatment (if applicable):    []  intact    []  redness- no adverse reaction     []redness  adverse reaction:        49 min Therapeutic Exercise:  [x]  See flow sheet   Rationale:      increase ROM, increase strength, improve balance and increase proprioception to improve the patients ability to improve functional abilities     12 min Manual Therapy: STM/tissue mobs to distal quads,scar massage, PROM knee flexion   Rationale:      decrease pain, increase ROM, increase tissue extensibility and decrease trigger points to improve patient's ability to improve functional mobility   The manual therapy interventions were performed at a separate and distinct time from the therapeutic activities interventions. Billed With/As:   [] TE   [] TA   [] Neuro   [] Self Care Patient Education: [x] Review HEP    [] Progressed/Changed HEP based on:   [] positioning   [] body mechanics   [] transfers   [] heat/ice application    [] other:      Other Objective/Functional Measures:       Post Treatment Pain Level (on 0 to 10) scale:   0  / 10     ASSESSMENT  Assessment/Changes in Function:   Pt presented with chief c/o increased quadriceps soreness from increased repetitive bending/squatting with moving activity since last treatment. Pt has also been D/Juan from post op brace at MD f/u since last treatment and stated that MD was pleased with her progress. Pt was able to tolerate increased reps with 4 way SLR series as well as increased resistance with mini band side steps. Will continue to progress/advance patient within current POC as tolerated with monitoring symptoms. []  See Progress Note/Recertification   Patient will continue to benefit from skilled PT services to modify and progress therapeutic interventions, address functional mobility deficits, address ROM deficits, address strength deficits, analyze and address soft tissue restrictions, analyze and cue movement patterns and instruct in home and community integration to attain remaining goals.    Progress toward goals / Updated goals:  1. Pt will score >72/100 on FOTO to show significant improvement in functional mobility and QOL  2.  Pt will demo SLR without quad lag to show improved stance phase stability and readiness for (I) ambulation 11/4/20: mild extensor lag, SLR 2x5; 11/9/20: 10x SLR w/o extensor lag; goal met 11/18/20: Pt has a slight quad lag with performing SLR's without brace  3. Pt will demo knee flexion AROM >120 for ease of dressing ADL's and stair navigation 11/9/20: goal in progress, knee flexion AROM 100 deg; 11/16/20: knee flexion 116 AROM post manual tx  4. Pt will demo knee/hip strength >4+/5 for return to PLOF with normalized gait and squat kinematics        PLAN  [x]  Upgrade activities as tolerated yes Continue plan of care   []  Discharge due to :    []  Other:      Therapist: Dave Cole PTA    Date: 11/23/2020 Time: 1:14 PM     Future Appointments   Date Time Provider Leidy Rivas   11/25/2020  2:15 PM Lavenia See, PTA [facility]   11/30/2020  1:45 PM Lavenia See, PTA [facility]   12/2/2020  1:30 PM Lavenia See, PTA [id number] [facility]   12/7/2020  1:15 PM Rita Reynolds, PT [facility]   12/9/2020  2:15 PM Lavenia See, PTA [facility]   12/14/2020  1:15 PM Rita Reynolds, PT [facility]   12/16/2020  1:30 PM Lavenia See, PTA [facility]   12/21/2020  1:00 PM Lavenia See, PTA [facility]   12/23/2020  1:30 PM Lavenia See, PTA [facility]   12/28/2020  1:00 PM Lavenia See, PTA [facility]   12/30/2020  1:30 PM Lavenia See, PTA [facility]

## 2020-11-30 ENCOUNTER — HOSPITAL ENCOUNTER (OUTPATIENT)
Dept: PHYSICAL THERAPY | Age: 25
Discharge: HOME | End: 2020-11-30
Payer: COMMERCIAL

## 2020-11-30 PROCEDURE — 97110 THERAPEUTIC EXERCISES: CPT

## 2020-11-30 PROCEDURE — 97140 MANUAL THERAPY 1/> REGIONS: CPT

## 2020-11-30 NOTE — PROGRESS NOTES
PHYSICAL THERAPY - DAILY TREATMENT NOTE    Patient Name: Janeth Blood        Date: 2020  : 1995   yes Patient  Verified  Visit #:   15   of   30  Insurance: Payor: Padmini Gail / Plan: [id number] RPN / Product Type: Commerical /      In time: 1:45 Out time: 2:50   Total Treatment Time: 65     Medicare/[employer] Time Tracking (below)   Total Timed Codes (min):   1:1 Treatment Time:       TREATMENT AREA =  Right knee pain [M25.561]    SUBJECTIVE  Pain Level (on 0 to 10 scale):  3  / 10   Medication Changes/New allergies or changes in medical history, any new surgeries or procedures?    no  If yes, update Summary List   Subjective Functional Status/Changes:  []  No changes reported   My knee has been hurting a little bit more over the past few days, but I don't remember doing anything different that would have flared it up because I pretty much just took it easy over the weekend.            OBJECTIVE  Modalities Rationale:     decrease inflammation and decrease pain to improve patient's ability to improve functional abilities   min [] Estim, type/location:                                      []  att     []  unatt     []  w/US     []  w/ice    []  w/heat    min []  Mechanical Traction: type/lbs                   []  pro   []  sup   []  int   []  cont    []  before manual    []  after manual    min []  Ultrasound, settings/location:      min []  Iontophoresis w/ dexamethasone, location:                                               []  take home patch       []  in clinic   10 min [x]  Ice     []  Heat    location/position: R quads/knee in long sitting     min []  Vasopneumatic Device, press/temp:     min []  Other:    [] Skin assessment post-treatment (if applicable):    []  intact    []  redness- no adverse reaction     []redness  adverse reaction:        43 min Therapeutic Exercise:  [x]  See flow sheet   Rationale:      increase ROM, increase strength, improve balance and increase proprioception to improve the patients ability to improve functional abilities      12 min Manual Therapy: STM/tissue mobs to distal quads, lateral knee and calf/popliteal fossa in prone   Rationale:      decrease pain, increase ROM, increase tissue extensibility and decrease trigger points to improve patient's ability to improve functional mobility   The manual therapy interventions were performed at a separate and distinct time from the therapeutic activities interventions. Billed With/As:   [] TE   [] TA   [] Neuro   [] Self Care Patient Education: [x] Review HEP    [] Progressed/Changed HEP based on:   [] positioning   [] body mechanics   [] transfers   [] heat/ice application    [] other:      Other Objective/Functional Measures:  Pt presented with chief c/o increased lateral knee pain/symptoms with no specific change/increase in activity since last treatment, but was able to tolerate full normal therex regiment including increased incline with [employer] squats with min to mod challenge today. pt did report increased benefit from focusing on involved regions with manual intervention today. Will continue to progress/advance patient within current POC as tolerated with monitoring symptoms. Post Treatment Pain Level (on 0 to 10) scale:   3  / 10     ASSESSMENT  Assessment/Changes in Function:          []  See Progress Note/Recertification   Patient will continue to benefit from skilled PT services to modify and progress therapeutic interventions, address functional mobility deficits, address ROM deficits, address strength deficits, analyze and address soft tissue restrictions, analyze and cue movement patterns and instruct in home and community integration to attain remaining goals.    Progress toward goals / Updated goals:  Progress toward goals / Updated goals:  1. Pt will score >72/100 on FOTO to show significant improvement in functional mobility and QOL  2.  Pt will demo SLR without quad lag to show improved stance phase stability and readiness for (I) ambulation 11/4/20: mild extensor lag, SLR 2x5; 11/9/20: 10x SLR w/o extensor lag; goal met 11/18/20: Pt has a slight quad lag with performing SLR's without brace  3. Pt will demo knee flexion AROM >120 for ease of dressing ADL's and stair navigation 11/9/20: goal in progress, knee flexion AROM 100 deg; 11/16/20: knee flexion 116 AROM post manual tx  4. Pt will demo knee/hip strength >4+/5 for return to PLOF with normalized gait and squat kinematics      PLAN  []  Upgrade activities as tolerated yes Continue plan of care   []  Discharge due to :    []  Other:      Therapist: Nancy Diez PTA    Date: 11/30/2020 Time: 1:46 PM     Future Appointments   Date Time Provider Leidy Rivas   12/2/2020  1:30 PM Ren Ortega PTA [facility]   12/7/2020  1:15 PM Sue Arrington, PT [facility]   12/9/2020  2:15 PM Ren Ortega, PTA [facility]   12/14/2020  1:15 PM Sue Arrington, PT [facility]   12/16/2020  1:30 PM Ren Ortega PTA [facility]   12/21/2020  1:00 PM Ren Ortega PTA [facility]   12/23/2020  1:30 PM Ren Ortega PTA [facility]   12/28/2020  1:00 PM Ren Ortega PTA [facility]   12/30/2020  1:30 PM Ren Ortega PTA [facility]

## 2020-12-02 ENCOUNTER — HOSPITAL ENCOUNTER (OUTPATIENT)
Dept: PHYSICAL THERAPY | Age: 25
Discharge: HOME | End: 2020-12-02
Payer: COMMERCIAL

## 2020-12-02 PROCEDURE — 97140 MANUAL THERAPY 1/> REGIONS: CPT

## 2020-12-02 PROCEDURE — 97110 THERAPEUTIC EXERCISES: CPT

## 2020-12-02 NOTE — PROGRESS NOTES
[address] [facility]  [address], [place], [address] - Phone: [phone]  Fax: [phone]  PROGRESS NOTE  Patient Name: Ej Norton : 1995   Treatment/Medical Diagnosis: Right knee pain [M25.561]   Referral Source: Analy Conrad MD     Date of Initial Visit: 10/19/2020 Attended Visits: 13 Missed Visits: 3     SUMMARY OF TREATMENT  Pt has attended 14 sessions of PT s/p R knee ACL-R (DOS 10/8/2020). PT tx has consisted of therex, NMRE, gait training, manual tx, and modalities prn in order to improve R knee ROM, LE flexibility, strength/stability, balance, gait, and dec pain. . In addition pt has been instructed in HEP. CURRENT STATUS  Pt making steady progress thus far in PT, reporting 50% overall improvement towards PLOF,  3/10 max pain (occurring with strenuous activity), 1/10 avg pain. Pt does not persistent swelling. Improvements: ROM/flexibility, pain level, R knee stability  Functional limitations: stiffness after prolonged sitting, confidence w/ descending stairs  AROM: 0-131  MMT: knee flex 4/5, ext 3+/5, hip flex 4-/5, abd 4-/5, ext 4+/5  Flexibility: quad 120 deg (L 136)  Functional assessment: Stairs: reciprocal with dec eccentric quad control descending on the L, use of momentum when ascending L, SLS on dynamic surface 30\", squat 80 deg prior to weight shift  FOTO 54/100, GROC +4      Goal/Measure of Progress Goal Met? 1. Pt will score > 72/100 on FOTO to show significant improvement in functional mobility and QOL   Status at last Eval: 49 on IE, 52 at last PN Current Status: 54 progress   2. Pt will demo SLR w/o quad lag to show improved stance phase, stability, and readiness for I ambulation   Status at last Eval: ~15 deg extensor lag Current Status: No quad lag 2x10 yes   3.   Pt will demo knee flexion AROM > 120 deg for ease of dressing ADLs and stair navigation   Status at last Eval: 0-97 Current Status:  yes 4.  Pt will demo knee/hip strength > 4+/5 for return to PLOF w/ normalized gait and squat mechanics   Status at last Eval: Hip flex 4/5, abd 4+/5, ext 4+/5, knee flex 4-/5, knee ext 2+/5 Current Status: Knee flex 4/5, knee ext 3+/5, hip flex 4-/5, hip abd 4-/5, ext 4+/5 Goal in progress     New Goals to be achieved in __4__  weeks:  1. Pt will perform 2x10 lateral tap down from 6\" step w/ proper form to improve eccentric control for descending steps  2. Initiate light plyometric/dogging drills @ 10 weeks post op (as tolerated) as per protocol to progress towards return to PLOF  3. Improve FOTO score to >/= 72/100 to indicate improved function    RECOMMENDATIONS  Pt could benefit from continued PT 2-3x/wk for 4 weeks to progress PREs and functional strengthening to progress towards PLOF. If you have any questions/comments please contact us directly at [phone]. Thank you for allowing us to assist in the care of your patient. Therapist Signature: Jaqueline Mccoy, PT Date: 12/2/2020     Time: 2:59 PM   NOTE TO PHYSICIAN:  PLEASE COMPLETE THE ORDERS BELOW AND FAX TO   [facility] Physical Therapy: [phone]  If you are unable to process this request in 24 hours please contact our office: [phone]    ___ I have read the above report and request that my patient continue as recommended.   ___ I have read the above report and request that my patient continue therapy with the following changes/special instructions:_________________________________________________________   ___ I have read the above report and request that my patient be discharged from therapy.      Physician Signature:        Date:       Time:

## 2020-12-02 NOTE — PROGRESS NOTES
PHYSICAL THERAPY - DAILY TREATMENT NOTE    Patient Name: Ashwini Brown        Date: 2020  : 1995   yes Patient  Verified  Visit #:   15   of   30  Insurance: Payor: Richi Sharma / Plan: Sean Castillo RPN / Product Type: Commerical /      In time: 3:00 Out time: 3:48   Total Treatment Time: 48     Medicare/[facility] Time Tracking (below)   Total Timed Codes (min):  na 1:1 Treatment Time:  na     TREATMENT AREA =  Right knee pain [M25.561]    SUBJECTIVE  Pain Level (on 0 to 10 scale):  2  / 10   Medication Changes/New allergies or changes in medical history, any new surgeries or procedures?    no  If yes, update Summary List   Subjective Functional Status/Changes:  []  No changes reported     See PN          OBJECTIVE  40 min Therapeutic Exercise:  [x]  See flow sheet   Rationale:      increase ROM, increase strength, improve balance and increase proprioception to improve the patients ability to negotiate stairs     8 min Manual Therapy: STM to R distal bicep femoris, proximal lateral gastroc, MFR to distal ITB, scar massage   Rationale:      decrease pain, increase ROM, increase tissue extensibility and decrease trigger points to improve patient's ability to squat  The manual therapy interventions were performed at a separate and distinct time from the therapeutic activities interventions.     Billed With/As:   [x] TE   [] TA   [] Neuro   [] Self Care Patient Education: [x] Review HEP    [] Progressed/Changed HEP based on:   [] positioning   [] body mechanics   [] transfers   [] heat/ice application    [] other:      Other Objective/Functional Measures:    See PN     Post Treatment Pain Level (on 0 to 10) scale:   2  / 10     ASSESSMENT  Assessment/Changes in Function:     See PN     [x]  See Progress Note/Recertification   Patient will continue to benefit from skilled PT services to modify and progress therapeutic interventions, address functional mobility deficits, address ROM deficits, address strength deficits, analyze and address soft tissue restrictions, analyze and cue movement patterns, analyze and modify body mechanics/ergonomics, assess and modify postural abnormalities, address imbalance/dizziness and instruct in home and community integration to attain remaining goals.    Progress toward goals / Updated goals:    See PN     PLAN  []  Upgrade activities as tolerated yes Continue plan of care   []  Discharge due to :    []  Other:      Therapist: Jeny Clark PT    Date: 12/2/2020 Time: 2:59 PM     Future Appointments   Date Time Provider Leidy Rivas   12/2/2020  3:00 PM Tenisha [place], PT [facility]   12/7/2020  [facility]   12/9/2020  2:15 PM Aurora Mons, PTA [facility]   12/14/2020  1:[facility]   12/16/2020  1:30 PM Aurora Mons, PTA [facility]   12/21/2020  1:00 PM Aurora Mons, PTA [facility]   12/23/2020  1:30 PM Aurora Mons, PTA [facility]   12/28/2020  1:00 PM Aurora Mons, PTA [facility]   12/30/2020  1:30 PM Aurora Mons, PTA [facility]

## 2020-12-09 ENCOUNTER — APPOINTMENT (OUTPATIENT)
Dept: PHYSICAL THERAPY | Age: 25
End: 2020-12-09
Payer: COMMERCIAL

## 2020-12-14 ENCOUNTER — HOSPITAL ENCOUNTER (OUTPATIENT)
Dept: PHYSICAL THERAPY | Age: 25
Discharge: HOME | End: 2020-12-14
Payer: COMMERCIAL

## 2020-12-14 PROCEDURE — 97110 THERAPEUTIC EXERCISES: CPT

## 2020-12-14 PROCEDURE — 97140 MANUAL THERAPY 1/> REGIONS: CPT

## 2020-12-14 NOTE — PROGRESS NOTES
PHYSICAL THERAPY - DAILY TREATMENT NOTE    Patient Name: Carol Garcia        Date: 2020  : 1995   yes Patient  Verified  Visit #:   15   of   30  Insurance: Payor: Anabela Lazaro / Plan: [id number] RPN / Product Type: Commerical /      In time: 1:15 Out time: 2:02   Total Treatment Time: 47     Medicare/[employer] Time Tracking (below)   Total Timed Codes (min):  na 1:1 Treatment Time:  na     TREATMENT AREA =  Right knee pain [M25.561]    SUBJECTIVE  Pain Level (on 0 to 10 scale):  2-3  / 10   Medication Changes/New allergies or changes in medical history, any new surgeries or procedures?    no  If yes, update Summary List   Subjective Functional Status/Changes:  []  No changes reported     \"My knee is stiff today, probably because of the weather and because I missed my appointments last week.  I also have a bad migraine\"          OBJECTIVE  Modalities Rationale:     decrease inflammation and decrease pain to improve patient's ability to ambulate   min [] Estim, type/location:                                      []  att     []  unatt     []  w/US     []  w/ice    []  w/heat    min []  Mechanical Traction: type/lbs                   []  pro   []  sup   []  int   []  cont    []  before manual    []  after manual    min []  Ultrasound, settings/location:      min []  Iontophoresis w/ dexamethasone, location:                                               []  take home patch       []  in clinic   10 min [x]  Ice     []  Heat    location/position: To R knee in long sit    min []  Vasopneumatic Device, press/temp:     min []  Other:    [x] Skin assessment post-treatment (if applicable):    [x]  intact    []  redness- no adverse reaction     []redness  adverse reaction:        27 min Therapeutic Exercise:  [x]  See flow sheet   Rationale:      increase ROM, increase strength, improve balance and increase proprioception to improve the patients ability to ambulate, negotiate stairs     10 min Manual Therapy: STM to R med>lat gastroc, HS, popliteal release, tibiofemoral mobs for extension, supine hs str, pqs   Rationale:      decrease pain, increase ROM, increase tissue extensibility and decrease trigger points to improve patient's ability to complete ADLs  The manual therapy interventions were performed at a separate and distinct time from the therapeutic activities interventions. Billed With/As:   [x] TE   [] TA   [] Neuro   [] Self Care Patient Education: [x] Review HEP    [] Progressed/Changed HEP based on:   [] positioning   [] body mechanics   [] transfers   [] heat/ice application    [] other:      Other Objective/Functional Measures: Added step ups, lat tap downs, and trap bar deadlift to improve LE strength  Added miniband IR/ER glute prep for glute activation     Post Treatment Pain Level (on 0 to 10) scale:   2  / 10     ASSESSMENT  Assessment/Changes in Function:     Withheld higher level core exercise 2' pt c/o inc head pain from migraine. [facility]     []  See Progress Note/Recertification   Patient will continue to benefit from skilled PT services to modify and progress therapeutic interventions, address functional mobility deficits, address ROM deficits, address strength deficits, analyze and address soft tissue restrictions, analyze and cue movement patterns, analyze and modify body mechanics/ergonomics, assess and modify postural abnormalities, address imbalance/dizziness and instruct in home and community integration to attain remaining goals. Progress toward goals / Updated goals:    1. Pt will perform 2x10 lateral tap down from 6\" step w/ proper form to improve eccentric control for descending steps 12/14/20: added step ups and lat tap down this visit to addres  2. Initiate light plyometric/dogging drills @ 10 weeks post op (as tolerated) as per protocol to progress towards return to PLOF  3.  Improve FOTO score to >/= 72/100 to indicate improved function  \     PLAN  []  Upgrade activities as tolerated yes Continue plan of care   []  Discharge due to :    []  Other:      Therapist: Jyotsna Causey PT    Date: 12/14/2020 Time: 1:26 PM     Future Appointments   Date Time Provider Leidy Rivas   12/16/2020  1:30 PM Ko Quinonez, PROSPER [facility]   12/21/2020  1:00 PM Ko Quinonez PTA [facility]   12/23/2020  1:30 PM Ko Quinonez, PTA [facility]   12/28/2020  1:00 PM Ko Quinonez, PTA [email or web address] [facility]   12/30/2020  1:30 PM Ko Quinonez, PROSPER [facility]

## 2020-12-16 ENCOUNTER — HOSPITAL ENCOUNTER (OUTPATIENT)
Dept: PHYSICAL THERAPY | Age: 25
Discharge: HOME | End: 2020-12-16
Payer: COMMERCIAL

## 2020-12-16 PROCEDURE — 97110 THERAPEUTIC EXERCISES: CPT

## 2020-12-16 PROCEDURE — 97140 MANUAL THERAPY 1/> REGIONS: CPT

## 2020-12-16 NOTE — PROGRESS NOTES
PHYSICAL THERAPY - DAILY TREATMENT NOTE    Patient Name: Zeinab Akers        Date: 2020  : 1995   yes Patient  Verified  Visit #:     Insurance: Payor: Omar Benito / Plan: Marian BLACKWOOD / Product Type: Commerical /      In time: 1:31 Out time: 2:52   Total Treatment Time: 81     Medicare/[facility] Time Tracking (below)   Total Timed Codes (min):   1:1 Treatment Time:       TREATMENT AREA =  Right knee pain [M25.561]    SUBJECTIVE  Pain Level (on 0 to 10 scale):  0  / 10   Medication Changes/New allergies or changes in medical history, any new surgeries or procedures?    no  If yes, update Summary List   Subjective Functional Status/Changes:  []  No changes reported   My knee is feeling pretty good, I really don't have any pain to speak of today despite the weather conditions.            OBJECTIVE  Modalities Rationale:     decrease inflammation and decrease pain to improve patient's ability to improve functional abilities    min [] Estim, type/location:                                      []  att     []  unatt     []  w/US     []  w/ice    []  w/heat    min []  Mechanical Traction: type/lbs                   []  pro   []  sup   []  int   []  cont    []  before manual    []  after manual    min []  Ultrasound, settings/location:      min []  Iontophoresis w/ dexamethasone, location:                                               []  take home patch       []  in clinic   10 min [x]  Ice     []  Heat    location/position: R distal hamstrings/popliteal fossa    min []  Vasopneumatic Device, press/temp:     min []  Other:    [] Skin assessment post-treatment (if applicable):    []  intact    []  redness- no adverse reaction     []redness  adverse reaction:        59 min Therapeutic Exercise:  [x]  See flow sheet   Rationale:      increase ROM, increase strength, improve balance and increase proprioception to improve the patients ability to improve functional mobility      12 min Manual Therapy: Manual hamstring stretching and STM/tissue mobs to distal hamstrings/popliteal fossa in prone   Rationale:      decrease pain, increase ROM, increase tissue extensibility and decrease trigger points to improve patient's ability to improve functional mobility   The manual therapy interventions were performed at a separate and distinct time from the therapeutic activities interventions. Billed With/As:   [] TE   [] TA   [] Neuro   [] Self Care Patient Education: [x] Review HEP    [] Progressed/Changed HEP based on:   [] positioning   [] body mechanics   [] transfers   [] heat/ice application    [] other:      Other Objective/Functional Measures:       Post Treatment Pain Level (on 0 to 10) scale:   0  / 10     ASSESSMENT  Assessment/Changes in Function:  Pt presented with chief c/o the most discomfort in distal hamstrings/popliteal fossa today. Pt was thoroughly assessed with all measurements taken for MNR today. See MNR assessment for full detailed progress. []  See Progress Note/Recertification   Patient will continue to benefit from skilled PT services to modify and progress therapeutic interventions, address functional mobility deficits, address ROM deficits, address strength deficits, analyze and address soft tissue restrictions, analyze and cue movement patterns and instruct in home and community integration to attain remaining goals.    Progress toward goals / Updated goals:  1. Pt will perform 2x10 lateral tap down from 6\" step w/ proper form to improve eccentric control for descending steps 12/14/20: added step ups and lat tap down this visit to addres  2. Initiate light plyometric/dogging drills @ 10 weeks post op (as tolerated) as per protocol to progress towards return to PLOF  3. Improve FOTO score to >/= 72/100 to indicate improved function     PLAN  [x]  Upgrade activities as tolerated yes Continue plan of care   []  Discharge due to :    []  Other:      Therapist: Raj Cho PTA    Date: 12/16/2020 Time: 1:33 PM     Future Appointments   Date Time Provider Leidy Evelyn   12/21/2020  1:00 PM Emely Story [facility]   12/23/2020  1:30 PM Saira Yost PTA [facility]   12/28/2020  1:00 PM Saira Yost PTA [facility]   12/30/2020  1:30 PM Saira Yost PTA [facility]

## 2020-12-23 ENCOUNTER — HOSPITAL ENCOUNTER (OUTPATIENT)
Dept: PHYSICAL THERAPY | Age: 25
Discharge: HOME | End: 2020-12-23
Payer: COMMERCIAL

## 2020-12-23 PROCEDURE — 97140 MANUAL THERAPY 1/> REGIONS: CPT

## 2020-12-23 PROCEDURE — 97110 THERAPEUTIC EXERCISES: CPT

## 2020-12-23 NOTE — PROGRESS NOTES
PHYSICAL THERAPY - DAILY TREATMENT NOTE    Patient Name: Altagracia Burleson        Date: 2020  : 1995   yes Patient  Verified  Visit #:     Insurance: Payor: Lizzie General / Plan: Maribeth BLACKWOOD / Product Type: Commerical /      In time: 1:35 Out time: 2:43   Total Treatment Time: 67     Medicare/[facility] Time Tracking (below)   Total Timed Codes (min):   1:1 Treatment Time:        TREATMENT AREA =  Right knee pain [M25.561]    SUBJECTIVE  Pain Level (on 0 to 10 scale):  0  / 10   Medication Changes/New allergies or changes in medical history, any new surgeries or procedures?    no  If yes, update Summary List   Subjective Functional Status/Changes:  []  No changes reported   My knee has been feeling pretty good lately, the only time that it feels a little unstable is when I first get up from sitting or laying down for a while and start to walk, but it goes away after a few steps.           OBJECTIVE  Modalities Rationale:     decrease inflammation and decrease pain to improve patient's ability to improve functional abilities    min [] Estim, type/location:                                      []  att     []  unatt     []  w/US     []  w/ice    []  w/heat    min []  Mechanical Traction: type/lbs                   []  pro   []  sup   []  int   []  cont    []  before manual    []  after manual    min []  Ultrasound, settings/location:      min []  Iontophoresis w/ dexamethasone, location:                                               []  take home patch       []  in clinic   10 min [x]  Ice     []  Heat    location/position: R knee/long sitting     min []  Vasopneumatic Device, press/temp:     min []  Other:    [] Skin assessment post-treatment (if applicable):    []  intact    []  redness- no adverse reaction     []redness  adverse reaction:        49 min Therapeutic Exercise:  [x]  See flow sheet   Rationale:      increase ROM, increase strength, improve balance and increase proprioception to improve the patients ability to improve functional abilities     8 min Manual Therapy: STM/tissue mobs to R distal quads/anterior knee   Rationale:      decrease pain, increase ROM, increase tissue extensibility and decrease trigger points to improve patient's ability to improve functional mobility   The manual therapy interventions were performed at a separate and distinct time from the therapeutic activities interventions. Billed With/As:   [] TE   [] TA   [] Neuro   [] Self Care Patient Education: [x] Review HEP    [] Progressed/Changed HEP based on:   [] positioning   [] body mechanics   [] transfers   [] heat/ice application    [] other:      Other Objective/Functional Measures:       Post Treatment Pain Level (on 0 to 10) scale:   0  / 10     ASSESSMENT  Assessment/Changes in Function:   Pt was able to advance to inclined [employer] bilateral plyojumps and trampoline jogging with moderate apprehension and challenge today. Otherwise, Pt presents with only c/o intermittent knee instability with initial standing/walking after prolonged static positioning. Will continue to progress/advance patient within current POC as tolerated with monitoring symptoms. []  See Progress Note/Recertification   Patient will continue to benefit from skilled PT services to modify and progress therapeutic interventions, address functional mobility deficits, address ROM deficits, address strength deficits, analyze and address soft tissue restrictions, analyze and cue movement patterns and instruct in home and community integration to attain remaining goals.    Progress toward goals / Updated goals:  1. Pt will perform 2x10 lateral tap down from 6\" step w/ proper form to improve eccentric control for descending steps 12/14/20: added step ups and lat tap down this visit to addres  2. Initiate light plyometric/dogging drills @ 10 weeks post op (as tolerated) as per protocol to progress towards return to PLOF 12/23/20: met, Pt was able to advance to inclined [employer] bilateral plyojumps and trampoline jogging today  3. Improve FOTO score to >/= 72/100 to indicate improved function     PLAN  [x]  Upgrade activities as tolerated yes Continue plan of care   []  Discharge due to :    []  Other:      Therapist: Arturo Rojas PTA    Date: 12/23/2020 Time: 1:51 PM     Future Appointments   Date Time Provider Leidy Rivas   12/28/2020  1:00 PM Tina Nicholas [address] [facility]   12/30/2020  1:30 PM Anuel Ortega PTA [facility]

## 2020-12-28 ENCOUNTER — HOSPITAL ENCOUNTER (OUTPATIENT)
Dept: PHYSICAL THERAPY | Age: 25
Discharge: HOME | End: 2020-12-28
Payer: COMMERCIAL

## 2020-12-28 PROCEDURE — 97110 THERAPEUTIC EXERCISES: CPT

## 2020-12-28 PROCEDURE — 97140 MANUAL THERAPY 1/> REGIONS: CPT

## 2020-12-28 NOTE — PROGRESS NOTES
PHYSICAL THERAPY - DAILY TREATMENT NOTE    Patient Name: Ej Allred        Date: 2020  : 1995   yes Patient  Verified  Visit #:      30  Insurance: Payor: Joel Villegas / Plan: John Lr RPN / Product Type: Commerical /      In time: 1:02 Out time: 2:13   Total Treatment Time: 71     Medicare/[facility] Time Tracking (below)   Total Timed Codes (min):   1:1 Treatment Time:       TREATMENT AREA =  Right knee pain [M25.561]    SUBJECTIVE  Pain Level (on 0 to 10 scale):  0  / 10   Medication Changes/New allergies or changes in medical history, any new surgeries or procedures?    no  If yes, update Summary List   Subjective Functional Status/Changes:  []  No changes reported   I don't have any pain or soreness in my knee, I'm just tired today.           OBJECTIVE  Modalities Rationale:     decrease inflammation and decrease pain to improve patient's ability to improve functional abilities    min [] Estim, type/location:                                      []  att     []  unatt     []  w/US     []  w/ice    []  w/heat    min []  Mechanical Traction: type/lbs                   []  pro   []  sup   []  int   []  cont    []  before manual    []  after manual    min []  Ultrasound, settings/location:      min []  Iontophoresis w/ dexamethasone, location:                                               []  take home patch       []  in clinic   10 min [x]  Ice     []  Heat    location/position: R quad/knee in long sitting    min []  Vasopneumatic Device, press/temp:     min []  Other:    [] Skin assessment post-treatment (if applicable):    []  intact    []  redness- no adverse reaction     []redness  adverse reaction:        49 min Therapeutic Exercise:  [x]  See flow sheet   Rationale:      increase ROM, increase strength, improve balance and increase proprioception to improve the patients ability to improve functional abilities      12 min Manual Therapy: STM/tissue mobs to R distal quads/anterior knee in supine and distal hamstrings/popliteal fossa in prone    Rationale:      decrease pain, increase ROM, increase tissue extensibility and decrease trigger points to improve patient's ability to improve functional mobility   The manual therapy interventions were performed at a separate and distinct time from the therapeutic activities interventions. Billed With/As:   [] TE   [] TA   [] Neuro   [] Self Care Patient Education: [x] Review HEP    [] Progressed/Changed HEP based on:   [] positioning   [] body mechanics   [] transfers   [] heat/ice application    [] other:      Other Objective/Functional Measures:       Post Treatment Pain Level (on 0 to 10) scale:   0  / 10     ASSESSMENT  Assessment/Changes in Function:   Pt presented with chief c/o mild post exercise soreness for the remainder of the day after advancing therex regiment last visit. Pt was able to tolerate same regiment today including addition of Lateral X as well as increasing from 4\" to 6\" step with lateral tap downs with moderate challenge today. Will continue to progress/advance patient within current POC as tolerated with monitoring symptoms. []  See Progress Note/Recertification   Patient will continue to benefit from skilled PT services to modify and progress therapeutic interventions, address functional mobility deficits, address ROM deficits, address strength deficits, analyze and address soft tissue restrictions, analyze and cue movement patterns and instruct in home and community integration to attain remaining goals.    Progress toward goals / Updated goals:  1. Pt will perform 2x10 lateral tap down from 6\" step w/ proper form to improve eccentric control for descending steps 12/28/20: Met, Pt was able to advance to performing 2 sets of 10 reps with lateral tap downs on 6\" step with moderate challenge with strength and control today  2. Initiate light plyometric/dogging drills @ 10 weeks post op (as tolerated) as per protocol to progress towards return to PLOF 12/23/20: met, Pt was able to advance to inclined [employer] bilateral plyojumps and trampoline jogging today  3. Improve FOTO score to >/= 72/100 to indicate improved function     PLAN  [x]  Upgrade activities as tolerated yes Continue plan of care   []  Discharge due to :    []  Other:      Therapist: Fermin Wick PTA    Date: 12/28/2020 Time: 1:01 PM     Future Appointments   Date Time Provider Leidy Rivas   12/30/2020  1:30 PM Poncho Briones PTA [facility]

## 2020-12-30 ENCOUNTER — APPOINTMENT (OUTPATIENT)
Dept: PHYSICAL THERAPY | Age: 25
End: 2020-12-30
Payer: COMMERCIAL

## 2021-01-04 ENCOUNTER — APPOINTMENT (OUTPATIENT)
Dept: PHYSICAL THERAPY | Age: 26
End: 2021-01-04
Payer: COMMERCIAL

## 2021-01-06 ENCOUNTER — APPOINTMENT (OUTPATIENT)
Dept: PHYSICAL THERAPY | Age: 26
End: 2021-01-06
Payer: COMMERCIAL

## 2021-01-11 ENCOUNTER — HOSPITAL ENCOUNTER (OUTPATIENT)
Dept: PHYSICAL THERAPY | Age: 26
End: 2021-01-11
Payer: COMMERCIAL

## 2021-01-13 ENCOUNTER — HOSPITAL ENCOUNTER (OUTPATIENT)
Dept: PHYSICAL THERAPY | Age: 26
Discharge: HOME | End: 2021-01-13
Payer: COMMERCIAL

## 2021-01-13 ENCOUNTER — APPOINTMENT (OUTPATIENT)
Dept: PHYSICAL THERAPY | Age: 26
End: 2021-01-13
Payer: COMMERCIAL

## 2021-01-13 PROCEDURE — 97110 THERAPEUTIC EXERCISES: CPT

## 2021-01-13 PROCEDURE — 97140 MANUAL THERAPY 1/> REGIONS: CPT

## 2021-01-13 NOTE — PROGRESS NOTES
PHYSICAL THERAPY - DAILY TREATMENT NOTE    Patient Name: Beatriz House        Date: 2021  : 1995   yes Patient  Verified  Visit #:      30  Insurance: Payor: NINA / Plan: ERICKSON WOOD RPN / Product Type: Commerical /      In time: 2:17 Out time: 3:45   Total Treatment Time: 88     Medicare/[facility] Time Tracking (below)   Total Timed Codes (min):   1:1 Treatment Time:       TREATMENT AREA =  Right knee pain [M25.561]    SUBJECTIVE  Pain Level (on 0 to 10 scale):  2-3  / 10   Medication Changes/New allergies or changes in medical history, any new surgeries or procedures?    no  If yes, update Summary List   Subjective Functional Status/Changes:  []  No changes reported   My knee has been hurting a little bit more and it has been more stiff from not having therapy over the past couple of weeks.           OBJECTIVE      68 min Therapeutic Exercise:  [x]  See flow sheet   Rationale:      increase ROM, increase strength, improve balance and increase proprioception to improve the patient’s ability to improve functional abilities     10 min Manual Therapy: STM/tissue mobs to entire circumference of knee and distal quariceps    Rationale:      decrease pain, increase ROM, increase tissue extensibility and decrease trigger points to improve patient's ability to improve functional mobility   The manual therapy interventions were performed at a separate and distinct time from the therapeutic activities interventions.    Billed With/As:   [] TE   [] TA   [] Neuro   [] Self Care Patient Education: [x] Review HEP    [] Progressed/Changed HEP based on:   [] positioning   [] body mechanics   [] transfers   [] heat/ice application    [] other:      Other Objective/Functional Measures:  Pt started feeling lightheaded upon standing after performing bridges in sports performance area with BP measured at 84/40 in sitting, pt was escorted over to bed where her BP increased to 102/64 in supine as well as in sitting  before leaving treatment. Pt reports that she had been fasting for the day with taking her blood pressure medication last night (only used to help her sleep). Pt had a granola bar and reported that she felt fine before leaving treatment today. Post Treatment Pain Level (on 0 to 10) scale:   2-3  / 10     ASSESSMENT  Assessment/Changes in Function:   See Progress note/Physician update for full detailed progress towards established goals. [x]  See Progress Note/Recertification   Patient will continue to benefit from skilled PT services to modify and progress therapeutic interventions, address functional mobility deficits, address ROM deficits, address strength deficits, analyze and address soft tissue restrictions, analyze and cue movement patterns and instruct in home and community integration to attain remaining goals. Progress toward goals / Updated goals:  See Progress note/Physician update for full detailed progress towards established goals.      PLAN  [x]  Upgrade activities as tolerated yes Continue plan of care   []  Discharge due to :    []  Other:      Therapist: Hayden Law PTA    Date: 1/13/2021 Time: 2:35 PM     Future Appointments   Date Time Provider Leidy Rivas   1/18/2021  2:45 PM Melo Sanders PT [facility]   1/20/2021  3:00 PM [place] [facility]   1/25/2021  2:30 PM Domi Braga PTA [facility]   1/27/2021  2:15 PM Domi Braga PTA [facility]

## 2021-01-13 NOTE — PROGRESS NOTES
[address] [facility] PHYSICAL THERAPY  [facility], [facility], [address] - Phone: [phone]  Fax: [phone]  PROGRESS NOTE  Patient Name: Phuong Pichardo : 1995   Treatment/Medical Diagnosis: Right knee pain [M25.561]   Referral Source: Roseanna Cedillo MD     Date of Initial Visit: 10/19/20 Attended Visits: 18 Missed Visits: 8     SUMMARY OF TREATMENT  Pt has attended 18 sessions of PT s/p R knee ACL-R (DOS 10/8/2020). PT tx has consisted of therex, NMRE, gait training, manual tx, and modalities prn in order to improve R knee ROM, LE flexibility, strength/stability, balance, gait, and dec pain. . In addition pt has been instructed in HEP. CURRENT STATUS  Patient reports approximately 60% overall improvement from therapy since initial evaluation with 3-4/10 pain level on average, increased to 5-6/10 at the worst with no specific change/increase in activity. Pt has been making steady progress with gaining knee mobility as well as advancing with strengthening and proprioceptive/balance awareness including advancing to beginning level plyometrics and trampoline jogging to initiate return to jogging/running activity over the past 3 treatments. Pt has just returned to therapy after 2 week leave of absence since last treatment due to being on personal vacation. Pt would benefit from continued therapy for 8-10 additional visits to achieve maximum medical benefit/potential from current POC. Will continue to progress/advance patient within current POC as tolerated with monitoring symptoms. R knee AROM= 0-131 degrees (measured in supine)  R hip/knee strength measurements= HIP= Flexion= 4-/5; Abduction= 4+/5; Extension= 4+/5; KNEE= Quads= 4-/5; Hamstrings= 4+/5  Goal/Measure of Progress Goal Met? 1.   Pt will perform 2x10 lateral tap down from 6\" step w/ proper form to improve eccentric control for descending steps   Status at last Eval: Progressing  Current Status: Able to perform for 2 sets of 15 on 6\" step with good form, but with moderate challenge with control and endurance yes   2. Initiate light plyometric/jogging drills @ 10 weeks post op (as tolerated) as per protocol to progress towards return to PLOF   Status at last Eval: New goal established last assessment Current Status: Met yes   3. Improve FOTO score to >/= 72/100 to indicate improved function   Status at last Eval: 54/100 Current Status: 59/100 no     New Goals to be achieved in __8-10__  treatments:  1. Improve FOTO score to >/= 72/100 to indicate improved function  2. Improve R hip flexor and quadriceps strength to =/> 4+/5 for increased functional LE strength with ADL's.    3. Pt will be able to advance to plyobox jumps on 6' step as well as treadmill walk/jog intervals for goal of returning to running activity. RECOMMENDATIONS  Continue with current POC for 8-10 additional visits with advancing as tolerated, then reassess for the need for continuation or discharge from therapy. If you have any questions/comments please contact us directly at [phone]. Thank you for allowing us to assist in the care of your patient. Therapist Signature: Jesse Turcios PTA Date: 1/13/2021    Alonzo Green PT, DPT, CMTPT Time: 2:41 PM   NOTE TO PHYSICIAN:  PLEASE COMPLETE THE ORDERS BELOW AND FAX TO   [facility] Physical Therapy: [phone]  If you are unable to process this request in 24 hours please contact our office: [phone]    ___ I have read the above report and request that my patient continue as recommended.   ___ I have read the above report and request that my patient continue therapy with the following changes/special instructions:_________________________________________________________   ___ I have read the above report and request that my patient be discharged from therapy.      Physician Signature:        Date:       Time:

## 2021-01-18 ENCOUNTER — HOSPITAL ENCOUNTER (OUTPATIENT)
Dept: PHYSICAL THERAPY | Age: 26
Discharge: HOME | End: 2021-01-18
Payer: COMMERCIAL

## 2021-01-18 PROCEDURE — 97530 THERAPEUTIC ACTIVITIES: CPT

## 2021-01-18 PROCEDURE — 97110 THERAPEUTIC EXERCISES: CPT

## 2021-01-18 NOTE — PROGRESS NOTES
PHYSICAL THERAPY - DAILY TREATMENT NOTE    Patient Name: Dorita Caba        Date: 2021  : 1995   yes Patient  Verified  Visit #:     Insurance: Payor: Yohan Weeks / Plan: Yoni Bourgeois RPN / Product Type: Commerical /      In time: 2:45 Out time: 3:34   Total Treatment Time: 49     Medicare/[facility] Time Tracking (below)   Total Timed Codes (min):  na 1:1 Treatment Time:  na     TREATMENT AREA =  Right knee pain [M25.561]    SUBJECTIVE  Pain Level (on 0 to 10 scale):  0  / 10   Medication Changes/New allergies or changes in medical history, any new surgeries or procedures?    no  If yes, update Summary List   Subjective Functional Status/Changes:  []  No changes reported     Pt reports having no knee pain today. Reports after her last session she went home and laid down for about 1 hour and felt better after. Reports she has since stopped taking Clonidine and has noticed a marked improvement in BP symptoms. Reports she had a light meal today; denies any sx of dizziness or light headedness. OBJECTIVE    25 min Therapeutic Exercise:  [x]  See flow sheet   Rationale:      increase ROM, increase strength and improve coordination to improve the patients ability to weight train    24 min Therapeutic Activity: [x]  See flow sheet   Rationale:    improve coordination, improve balance and increase proprioception to improve the patients ability to return to gym work outs    Billed With/As:   [x] TE   [] TA   [] Neuro   [] Self Care Patient Education: [x] Review HEP    [] Progressed/Changed HEP based on:   [] positioning   [] body mechanics   [] transfers   [] heat/ice application    [] other:      Other Objective/Functional Measures:    Performed lateral tap downs w/o UE assistance; significant quad fatigue noted but able to perform w/ proper form once cued for improve hip hinge/glute loading    Added back squat to 18\" box to address form/technique and posture.  Pt demo slight PPT at bottom of squat, likely related to dec core stability    Added lateral lunges to bosu to challenge proprioception, weighted box step ups to improve quad strength, nordic HS curls to improve eccentric HS strength, and full walking lunges to improve quad/glute strength    Completed session w/ foam roll to B LE to reduce muscle soreness post work out     Post Treatment Pain Level (on 0 to 10) scale:   0  / 10     ASSESSMENT  Assessment/Changes in Function:     Unable to progress plyometrics as planned 2' pt wearing Crocs to tx today. Pt ed on proper footwear as we progress into sport phase of rehab. Pt reports no desire to return to running, greater desire to return to weight training (free weight and machines) and rowing or elliptical. Cleared pt to return to ellipitcal and light leg press and HS curls at this time. []  See Progress Note/Recertification   Patient will continue to benefit from skilled PT services to modify and progress therapeutic interventions, address functional mobility deficits, address ROM deficits, address strength deficits, analyze and address soft tissue restrictions, analyze and cue movement patterns, analyze and modify body mechanics/ergonomics, assess and modify postural abnormalities and instruct in home and community integration to attain remaining goals. Progress toward goals / Updated goals:    1. Improve FOTO score to >/= 72/100 to indicate improved function  2. Improve R hip flexor and quadriceps strength to =/> 4+/5 for increased functional LE strength with ADL's.    3. Pt will be able to advance to plyobox jumps on 6' step as well as treadmill walk/jog intervals for goal of returning to running activity.        PLAN  []  Upgrade activities as tolerated yes Continue plan of care   []  Discharge due to :    []  Other:      Therapist: Orlando Menendez PT    Date: 1/18/2021 Time: 2:46 PM     Future Appointments   Date Time Provider Leidy Rivas   1/20/2021  3:00 PM Poncho Briones PROSPER [facility]   1/25/2021  2:30 PM Chandrika Payan [facility]   1/27/2021  2:15 PM Yola Richards PTA [facility]

## 2021-01-20 ENCOUNTER — APPOINTMENT (OUTPATIENT)
Dept: PHYSICAL THERAPY | Age: 26
End: 2021-01-20
Payer: COMMERCIAL

## 2021-01-21 ENCOUNTER — APPOINTMENT (OUTPATIENT)
Dept: PHYSICAL THERAPY | Age: 26
End: 2021-01-21
Payer: COMMERCIAL

## 2021-01-25 ENCOUNTER — APPOINTMENT (OUTPATIENT)
Dept: PHYSICAL THERAPY | Age: 26
End: 2021-01-25
Payer: COMMERCIAL

## 2021-01-27 ENCOUNTER — APPOINTMENT (OUTPATIENT)
Dept: PHYSICAL THERAPY | Age: 26
End: 2021-01-27
Payer: COMMERCIAL

## 2021-02-11 ENCOUNTER — APPOINTMENT (OUTPATIENT)
Dept: PHYSICAL THERAPY | Age: 26
End: 2021-02-11

## 2021-02-15 ENCOUNTER — APPOINTMENT (OUTPATIENT)
Dept: PHYSICAL THERAPY | Age: 26
End: 2021-02-15

## 2021-02-18 ENCOUNTER — APPOINTMENT (OUTPATIENT)
Dept: PHYSICAL THERAPY | Age: 26
End: 2021-02-18

## 2021-02-22 ENCOUNTER — APPOINTMENT (OUTPATIENT)
Dept: PHYSICAL THERAPY | Age: 26
End: 2021-02-22

## 2021-02-24 ENCOUNTER — APPOINTMENT (OUTPATIENT)
Dept: PHYSICAL THERAPY | Age: 26
End: 2021-02-24